# Patient Record
Sex: MALE | Race: WHITE | NOT HISPANIC OR LATINO | Employment: OTHER | URBAN - NONMETROPOLITAN AREA
[De-identification: names, ages, dates, MRNs, and addresses within clinical notes are randomized per-mention and may not be internally consistent; named-entity substitution may affect disease eponyms.]

---

## 2017-02-22 ENCOUNTER — OFFICE VISIT (OUTPATIENT)
Dept: UROLOGY | Facility: CLINIC | Age: 72
End: 2017-02-22

## 2017-02-22 ENCOUNTER — LAB (OUTPATIENT)
Dept: LAB | Facility: HOSPITAL | Age: 72
End: 2017-02-22
Attending: UROLOGY

## 2017-02-22 VITALS — HEIGHT: 67 IN | BODY MASS INDEX: 20.4 KG/M2 | WEIGHT: 130 LBS

## 2017-02-22 DIAGNOSIS — N40.1 BENIGN NON-NODULAR PROSTATIC HYPERPLASIA WITH LOWER URINARY TRACT SYMPTOMS: Primary | ICD-10-CM

## 2017-02-22 DIAGNOSIS — N40.1 BENIGN NON-NODULAR PROSTATIC HYPERPLASIA WITH LOWER URINARY TRACT SYMPTOMS: ICD-10-CM

## 2017-02-22 DIAGNOSIS — N52.9 ERECTILE DYSFUNCTION, UNSPECIFIED ERECTILE DYSFUNCTION TYPE: ICD-10-CM

## 2017-02-22 LAB — PSA SERPL-MCNC: 0.35 NG/ML (ref 0–4)

## 2017-02-22 PROCEDURE — 84153 ASSAY OF PSA TOTAL: CPT | Performed by: UROLOGY

## 2017-02-22 PROCEDURE — 36415 COLL VENOUS BLD VENIPUNCTURE: CPT | Performed by: UROLOGY

## 2017-02-22 PROCEDURE — 99203 OFFICE O/P NEW LOW 30 MIN: CPT | Performed by: UROLOGY

## 2017-02-22 RX ORDER — METOPROLOL SUCCINATE 50 MG/1
TABLET, EXTENDED RELEASE ORAL
Refills: 6 | COMMUNITY
Start: 2017-02-01

## 2017-02-22 RX ORDER — ROSUVASTATIN CALCIUM 5 MG/1
TABLET, COATED ORAL
Refills: 2 | COMMUNITY
Start: 2017-01-17

## 2017-02-22 RX ORDER — AMLODIPINE BESYLATE 5 MG/1
TABLET ORAL
Refills: 2 | COMMUNITY
Start: 2017-02-15

## 2017-02-22 RX ORDER — LEVALBUTEROL INHALATION SOLUTION 0.31 MG/3ML
SOLUTION RESPIRATORY (INHALATION)
Refills: 2 | COMMUNITY
Start: 2017-02-10

## 2017-02-22 NOTE — PROGRESS NOTES
Chief Complaint:          Chief Complaint   Patient presents with   • Erectile Dysfunction     Patient presents for erectile dysfunction.  He would like to try some Viagra.  He also would like a yearly prostate exam.  Patient denies any voiding problems and nocturia.       HPI:   71 y.o. male.    HPI  Pt has tried viagra and it has been useful to him.  Pt does not take nitrates.      Past Medical History:        Past Medical History   Diagnosis Date   • COPD (chronic obstructive pulmonary disease)    • Erectile dysfunction    • Hyperlipidemia    • Hypertension          Current Meds:     Current Outpatient Prescriptions   Medication Sig Dispense Refill   • amLODIPine (NORVASC) 5 MG tablet   2   • levalbuterol (XOPENEX) 0.31 MG/3ML nebulizer solution USE 1 VIAL IN NEBULIZER 3 TIMES DAILY AS NEEDED  2   • metoprolol succinate XL (TOPROL-XL) 50 MG 24 hr tablet   6   • NEXIUM 40 MG capsule   2   • rosuvastatin (CRESTOR) 5 MG tablet   2   • VENTOLIN  (90 BASE) MCG/ACT inhaler   2     No current facility-administered medications for this visit.         Allergies:      Allergies   Allergen Reactions   • Albuterol    • Sulfa Antibiotics    • Ultram [Tramadol Hcl]         Past Surgical History:     Past Surgical History   Procedure Laterality Date   • Cyst removal     • Hand surgery     • Throat surgery       removal of growth         Social History:     Social History     Social History   • Marital status: Unknown     Spouse name: N/A   • Number of children: N/A   • Years of education: N/A     Occupational History   • Not on file.     Social History Main Topics   • Smoking status: Current Every Day Smoker   • Smokeless tobacco: Not on file   • Alcohol use No   • Drug use: Not on file   • Sexual activity: Not on file     Other Topics Concern   • Not on file     Social History Narrative   • No narrative on file       Family History:     Family History   Problem Relation Age of Onset   • Lung cancer Father        Review  of Systems:     Review of Systems    Physical Exam:     Physical Exam   Constitutional: He is oriented to person, place, and time.   HENT:   Head: Normocephalic and atraumatic.   Right Ear: External ear normal.   Left Ear: External ear normal.   Nose: Nose normal.   Mouth/Throat: Oropharynx is clear and moist.   Eyes: Conjunctivae and EOM are normal. Pupils are equal, round, and reactive to light.   Neck: Normal range of motion. Neck supple. No thyromegaly present.   Cardiovascular: Normal rate, regular rhythm, normal heart sounds and intact distal pulses.    No murmur heard.  Pulmonary/Chest: Breath sounds normal. No respiratory distress. He has no wheezes. He has no rales. He exhibits no tenderness.   Changes of copd   Abdominal: Soft. Bowel sounds are normal. He exhibits no distension and no mass. There is no tenderness. No hernia.   Genitourinary: Rectum normal, prostate normal and penis normal.   Musculoskeletal: He exhibits no edema or tenderness.   Lymphadenopathy:     He has no cervical adenopathy.   Neurological: He is alert and oriented to person, place, and time. No cranial nerve deficit. He exhibits normal muscle tone. Coordination normal.   Skin: Skin is warm. No rash noted.   Psychiatric: He has a normal mood and affect. Judgment normal.       Procedure:     No notes on file      Assessment:     Encounter Diagnoses   Name Primary?   • Benign non-nodular prostatic hyperplasia with lower urinary tract symptoms Yes   • Erectile dysfunction, unspecified erectile dysfunction type      No orders of the defined types were placed in this encounter.      Plan:   Will check a psa and i have written for sildenafil or viagra.  Will see pt back in 1 year with a psa prior.    Counseling was given to patient for the following topics impressions. and the interim medical history and current results were reviewed.  A treatment plan with follow-up was made. Total time of the encounter was 37 minutes and 37 minutes were  spent discussing Benign non-nodular prostatic hyperplasia with lower urinary tract symptoms [N40.1] face-to-face.    This document has been electronically signed by Renny Chavez MD February 22, 2017 9:40 AM

## 2017-03-24 ENCOUNTER — HOSPITAL ENCOUNTER (EMERGENCY)
Dept: HOSPITAL 79 - ER1 | Age: 72
Discharge: HOME | End: 2017-03-24
Payer: COMMERCIAL

## 2017-03-24 DIAGNOSIS — I73.9: ICD-10-CM
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
## 2017-03-24 DIAGNOSIS — S16.1XXA: Primary | ICD-10-CM
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
## 2017-03-24 DIAGNOSIS — Z79.899: ICD-10-CM
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
## 2017-03-24 DIAGNOSIS — Z88.5: ICD-10-CM

## 2017-03-24 DIAGNOSIS — Z79.82: ICD-10-CM

## 2017-03-24 DIAGNOSIS — R10.9: ICD-10-CM

## 2017-03-24 DIAGNOSIS — F17.210: ICD-10-CM

## 2017-03-24 DIAGNOSIS — Z79.02: ICD-10-CM

## 2017-03-24 DIAGNOSIS — Z88.2: ICD-10-CM

## 2017-03-24 DIAGNOSIS — I25.10: ICD-10-CM

## 2017-03-24 LAB
BUN/CREATININE RATIO: 9 (ref 0–10)
HGB BLD-MCNC: 13.5 GM/DL (ref 14–17.5)
RED BLOOD COUNT: 4.17 M/UL (ref 4.2–5.5)
WHITE BLOOD COUNT: 12.6 K/UL (ref 4.5–11)

## 2017-03-28 ENCOUNTER — HOSPITAL ENCOUNTER (OUTPATIENT)
Dept: HOSPITAL 79 - KOH-I | Age: 72
End: 2017-03-28
Payer: MEDICARE

## 2017-03-28 DIAGNOSIS — R06.00: ICD-10-CM

## 2017-03-28 DIAGNOSIS — R05: Primary | ICD-10-CM

## 2017-03-28 DIAGNOSIS — J43.9: ICD-10-CM

## 2017-05-15 ENCOUNTER — HOSPITAL ENCOUNTER (OUTPATIENT)
Dept: HOSPITAL 79 - KOH-I | Age: 72
End: 2017-05-15
Attending: NURSE PRACTITIONER
Payer: MEDICARE

## 2017-05-15 DIAGNOSIS — M25.831: ICD-10-CM

## 2017-05-15 DIAGNOSIS — M25.531: Primary | ICD-10-CM

## 2017-05-15 DIAGNOSIS — R60.0: ICD-10-CM

## 2020-07-09 ENCOUNTER — TRANSCRIBE ORDERS (OUTPATIENT)
Dept: ADMINISTRATIVE | Facility: HOSPITAL | Age: 75
End: 2020-07-09

## 2020-07-09 DIAGNOSIS — Z87.891 PERSONAL HISTORY OF TOBACCO USE, PRESENTING HAZARDS TO HEALTH: Primary | ICD-10-CM

## 2020-07-29 ENCOUNTER — APPOINTMENT (OUTPATIENT)
Dept: CT IMAGING | Facility: HOSPITAL | Age: 75
End: 2020-07-29

## 2020-07-30 ENCOUNTER — HOSPITAL ENCOUNTER (OUTPATIENT)
Dept: CT IMAGING | Facility: HOSPITAL | Age: 75
Discharge: HOME | End: 2020-07-30
Admitting: FAMILY MEDICINE

## 2020-07-30 DIAGNOSIS — Z87.891 PERSONAL HISTORY OF TOBACCO USE, PRESENTING HAZARDS TO HEALTH: ICD-10-CM

## 2020-07-30 PROCEDURE — G0297 LDCT FOR LUNG CA SCREEN: HCPCS | Performed by: RADIOLOGY

## 2020-07-30 PROCEDURE — G0297 LDCT FOR LUNG CA SCREEN: HCPCS

## 2021-08-31 ENCOUNTER — HOSPITAL ENCOUNTER (OUTPATIENT)
Dept: HOSPITAL 79 - ECHO | Age: 76
End: 2021-08-31
Attending: PHYSICIAN ASSISTANT
Payer: MEDICARE

## 2021-08-31 DIAGNOSIS — I08.1: ICD-10-CM

## 2021-08-31 DIAGNOSIS — I42.9: ICD-10-CM

## 2021-08-31 DIAGNOSIS — I20.9: Primary | ICD-10-CM

## 2021-08-31 DIAGNOSIS — I27.20: ICD-10-CM

## 2021-08-31 DIAGNOSIS — I50.20: ICD-10-CM

## 2021-08-31 DIAGNOSIS — R00.2: ICD-10-CM

## 2021-08-31 PROCEDURE — A9502 TC99M TETROFOSMIN: HCPCS

## 2021-11-17 ENCOUNTER — HOSPITAL ENCOUNTER (OUTPATIENT)
Dept: HOSPITAL 79 - CATH | Age: 76
End: 2021-11-17
Attending: INTERNAL MEDICINE
Payer: MEDICARE

## 2021-11-17 DIAGNOSIS — I44.7: ICD-10-CM

## 2021-11-17 DIAGNOSIS — I50.22: ICD-10-CM

## 2021-11-17 DIAGNOSIS — I42.0: ICD-10-CM

## 2021-11-17 DIAGNOSIS — I25.5: ICD-10-CM

## 2021-11-17 DIAGNOSIS — I27.20: ICD-10-CM

## 2021-11-17 DIAGNOSIS — Z95.810: ICD-10-CM

## 2021-11-17 DIAGNOSIS — F17.210: ICD-10-CM

## 2021-11-17 DIAGNOSIS — I25.118: Primary | ICD-10-CM

## 2021-11-17 DIAGNOSIS — I25.82: ICD-10-CM

## 2021-11-17 DIAGNOSIS — E78.5: ICD-10-CM

## 2021-11-17 DIAGNOSIS — I25.2: ICD-10-CM

## 2021-11-17 DIAGNOSIS — J44.9: ICD-10-CM

## 2021-11-17 DIAGNOSIS — K21.9: ICD-10-CM

## 2021-11-17 DIAGNOSIS — I11.0: ICD-10-CM

## 2021-11-17 DIAGNOSIS — Z95.820: ICD-10-CM

## 2021-11-17 PROCEDURE — C1760 CLOSURE DEV, VASC: HCPCS

## 2021-11-17 PROCEDURE — C1769 GUIDE WIRE: HCPCS

## 2022-10-28 ENCOUNTER — TRANSCRIBE ORDERS (OUTPATIENT)
Dept: LAB | Facility: HOSPITAL | Age: 77
End: 2022-10-28

## 2022-10-28 DIAGNOSIS — R10.11 RUQ PAIN: Primary | ICD-10-CM

## 2022-11-02 ENCOUNTER — HOSPITAL ENCOUNTER (OUTPATIENT)
Dept: ULTRASOUND IMAGING | Facility: HOSPITAL | Age: 77
Discharge: HOME | End: 2022-11-02
Admitting: FAMILY MEDICINE

## 2022-11-02 DIAGNOSIS — R10.11 RUQ PAIN: ICD-10-CM

## 2022-11-02 PROCEDURE — 76700 US EXAM ABDOM COMPLETE: CPT | Performed by: RADIOLOGY

## 2022-11-02 PROCEDURE — 76700 US EXAM ABDOM COMPLETE: CPT

## 2022-12-20 ENCOUNTER — HOSPITAL ENCOUNTER (INPATIENT)
Facility: HOSPITAL | Age: 77
End: 2022-12-20
Attending: INTERNAL MEDICINE | Admitting: INTERNAL MEDICINE

## 2022-12-30 ENCOUNTER — HOSPITAL ENCOUNTER (OUTPATIENT)
Dept: GENERAL RADIOLOGY | Facility: HOSPITAL | Age: 77
Discharge: HOME | End: 2022-12-30
Admitting: NURSE PRACTITIONER

## 2022-12-30 ENCOUNTER — TRANSCRIBE ORDERS (OUTPATIENT)
Dept: GENERAL RADIOLOGY | Facility: HOSPITAL | Age: 77
End: 2022-12-30

## 2022-12-30 DIAGNOSIS — R07.89 OTHER CHEST PAIN: ICD-10-CM

## 2022-12-30 DIAGNOSIS — R07.89 OTHER CHEST PAIN: Primary | ICD-10-CM

## 2022-12-30 PROCEDURE — 71046 X-RAY EXAM CHEST 2 VIEWS: CPT | Performed by: RADIOLOGY

## 2022-12-30 PROCEDURE — 71046 X-RAY EXAM CHEST 2 VIEWS: CPT

## 2023-01-07 ENCOUNTER — LAB REQUISITION (OUTPATIENT)
Dept: LAB | Facility: HOSPITAL | Age: 78
End: 2023-01-07
Payer: MEDICARE

## 2023-01-07 DIAGNOSIS — N19 UNSPECIFIED KIDNEY FAILURE: ICD-10-CM

## 2023-01-07 LAB — NT-PROBNP SERPL-MCNC: 373 PG/ML (ref 0–1800)

## 2023-01-07 PROCEDURE — 83880 ASSAY OF NATRIURETIC PEPTIDE: CPT | Performed by: INTERNAL MEDICINE

## 2023-02-26 ENCOUNTER — APPOINTMENT (OUTPATIENT)
Dept: GENERAL RADIOLOGY | Facility: HOSPITAL | Age: 78
End: 2023-02-26
Payer: MEDICARE

## 2023-02-26 ENCOUNTER — APPOINTMENT (OUTPATIENT)
Dept: CT IMAGING | Facility: HOSPITAL | Age: 78
End: 2023-02-26
Payer: MEDICARE

## 2023-02-26 ENCOUNTER — HOSPITAL ENCOUNTER (EMERGENCY)
Facility: HOSPITAL | Age: 78
Discharge: NURSING, REHAB OR LONG-TERM CARE | End: 2023-02-26
Attending: EMERGENCY MEDICINE | Admitting: EMERGENCY MEDICINE
Payer: MEDICARE

## 2023-02-26 VITALS
DIASTOLIC BLOOD PRESSURE: 82 MMHG | HEART RATE: 70 BPM | WEIGHT: 130 LBS | OXYGEN SATURATION: 96 % | RESPIRATION RATE: 20 BRPM | BODY MASS INDEX: 20.4 KG/M2 | SYSTOLIC BLOOD PRESSURE: 168 MMHG | HEIGHT: 67 IN | TEMPERATURE: 98 F
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
## 2023-02-26 DIAGNOSIS — S09.90XA MINOR HEAD INJURY, INITIAL ENCOUNTER: ICD-10-CM
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
## 2023-02-26 LAB
ALBUMIN SERPL-MCNC: 3 G/DL (ref 3.5–5.2)
ALBUMIN/GLOB SERPL: 0.8 G/DL
ALP SERPL-CCNC: 178 U/L (ref 39–117)
ALT SERPL W P-5'-P-CCNC: 12 U/L (ref 1–41)
ANION GAP SERPL CALCULATED.3IONS-SCNC: 12.4 MMOL/L (ref 5–15)
AST SERPL-CCNC: 17 U/L (ref 1–40)
BACTERIA UR QL AUTO: ABNORMAL /HPF
BASOPHILS # BLD AUTO: 0.09 10*3/MM3 (ref 0–0.2)
BASOPHILS NFR BLD AUTO: 0.6 % (ref 0–1.5)
BILIRUB SERPL-MCNC: 0.2 MG/DL (ref 0–1.2)
BILIRUB UR QL STRIP: NEGATIVE
BUN SERPL-MCNC: 13 MG/DL (ref 8–23)
BUN/CREAT SERPL: 14.3 (ref 7–25)
CALCIUM SPEC-SCNC: 9.3 MG/DL (ref 8.6–10.5)
CHLORIDE SERPL-SCNC: 96 MMOL/L (ref 98–107)
CLARITY UR: CLEAR
CO2 SERPL-SCNC: 23.6 MMOL/L (ref 22–29)
COLOR UR: YELLOW
CREAT SERPL-MCNC: 0.91 MG/DL (ref 0.76–1.27)
CRP SERPL-MCNC: 6.81 MG/DL (ref 0–0.5)
DEPRECATED RDW RBC AUTO: 45.9 FL (ref 37–54)
EGFRCR SERPLBLD CKD-EPI 2021: 87.9 ML/MIN/1.73
EOSINOPHIL # BLD AUTO: 0.27 10*3/MM3 (ref 0–0.4)
EOSINOPHIL NFR BLD AUTO: 1.9 % (ref 0.3–6.2)
ERYTHROCYTE [DISTWIDTH] IN BLOOD BY AUTOMATED COUNT: 13.2 % (ref 12.3–15.4)
GLOBULIN UR ELPH-MCNC: 3.6 GM/DL
GLUCOSE SERPL-MCNC: 98 MG/DL (ref 65–99)
GLUCOSE UR STRIP-MCNC: NEGATIVE MG/DL
HCT VFR BLD AUTO: 28.5 % (ref 37.5–51)
HGB BLD-MCNC: 9.9 G/DL (ref 13–17.7)
HGB UR QL STRIP.AUTO: NEGATIVE
HOLD SPECIMEN: NORMAL
HOLD SPECIMEN: NORMAL
HYALINE CASTS UR QL AUTO: ABNORMAL /LPF
IMM GRANULOCYTES # BLD AUTO: 0.33 10*3/MM3 (ref 0–0.05)
IMM GRANULOCYTES NFR BLD AUTO: 2.3 % (ref 0–0.5)
KETONES UR QL STRIP: NEGATIVE
LEUKOCYTE ESTERASE UR QL STRIP.AUTO: NEGATIVE
LYMPHOCYTES # BLD AUTO: 2.09 10*3/MM3 (ref 0.7–3.1)
LYMPHOCYTES NFR BLD AUTO: 14.7 % (ref 19.6–45.3)
MCH RBC QN AUTO: 33.1 PG (ref 26.6–33)
MCHC RBC AUTO-ENTMCNC: 34.7 G/DL (ref 31.5–35.7)
MCV RBC AUTO: 95.3 FL (ref 79–97)
MONOCYTES # BLD AUTO: 1.72 10*3/MM3 (ref 0.1–0.9)
MONOCYTES NFR BLD AUTO: 12.1 % (ref 5–12)
NEUTROPHILS NFR BLD AUTO: 68.4 % (ref 42.7–76)
NEUTROPHILS NFR BLD AUTO: 9.73 10*3/MM3 (ref 1.7–7)
NITRITE UR QL STRIP: NEGATIVE
NRBC BLD AUTO-RTO: 0 /100 WBC (ref 0–0.2)
PH UR STRIP.AUTO: 7 [PH] (ref 5–8)
PLATELET # BLD AUTO: 621 10*3/MM3 (ref 140–450)
PMV BLD AUTO: 8.8 FL (ref 6–12)
POTASSIUM SERPL-SCNC: 4 MMOL/L (ref 3.5–5.2)
PROT SERPL-MCNC: 6.6 G/DL (ref 6–8.5)
PROT UR QL STRIP: ABNORMAL
RBC # BLD AUTO: 2.99 10*6/MM3 (ref 4.14–5.8)
RBC # UR STRIP: ABNORMAL /HPF
REF LAB TEST METHOD: ABNORMAL
SODIUM SERPL-SCNC: 132 MMOL/L (ref 136–145)
SP GR UR STRIP: 1.01 (ref 1–1.03)
SQUAMOUS #/AREA URNS HPF: ABNORMAL /HPF
UROBILINOGEN UR QL STRIP: ABNORMAL
WBC # UR STRIP: ABNORMAL /HPF
WBC NRBC COR # BLD: 14.23 10*3/MM3 (ref 3.4–10.8)
WHOLE BLOOD HOLD COAG: NORMAL
WHOLE BLOOD HOLD SPECIMEN: NORMAL

## 2023-02-26 PROCEDURE — 85025 COMPLETE CBC W/AUTO DIFF WBC: CPT | Performed by: NURSE PRACTITIONER

## 2023-02-26 PROCEDURE — P9612 CATHETERIZE FOR URINE SPEC: HCPCS

## 2023-02-26 PROCEDURE — 81001 URINALYSIS AUTO W/SCOPE: CPT | Performed by: NURSE PRACTITIONER

## 2023-02-26 PROCEDURE — 86140 C-REACTIVE PROTEIN: CPT | Performed by: NURSE PRACTITIONER

## 2023-02-26 PROCEDURE — 36415 COLL VENOUS BLD VENIPUNCTURE: CPT

## 2023-02-26 PROCEDURE — 71045 X-RAY EXAM CHEST 1 VIEW: CPT

## 2023-02-26 PROCEDURE — 80053 COMPREHEN METABOLIC PANEL: CPT | Performed by: NURSE PRACTITIONER

## 2023-02-26 PROCEDURE — 70450 CT HEAD/BRAIN W/O DYE: CPT

## 2023-02-26 PROCEDURE — 99284 EMERGENCY DEPT VISIT MOD MDM: CPT

## 2023-03-01 ENCOUNTER — LAB REQUISITION (OUTPATIENT)
Dept: LAB | Facility: HOSPITAL | Age: 78
End: 2023-03-01
Payer: MEDICARE

## 2023-03-01 DIAGNOSIS — I10 ESSENTIAL (PRIMARY) HYPERTENSION: ICD-10-CM

## 2023-03-01 LAB
ANION GAP SERPL CALCULATED.3IONS-SCNC: 8.5 MMOL/L (ref 5–15)
BASOPHILS # BLD AUTO: 0.12 10*3/MM3 (ref 0–0.2)
BASOPHILS NFR BLD AUTO: 0.8 % (ref 0–1.5)
BUN SERPL-MCNC: 16 MG/DL (ref 8–23)
BUN/CREAT SERPL: 16.5 (ref 7–25)
CALCIUM SPEC-SCNC: 9.7 MG/DL (ref 8.6–10.5)
CHLORIDE SERPL-SCNC: 95 MMOL/L (ref 98–107)
CO2 SERPL-SCNC: 29.5 MMOL/L (ref 22–29)
CREAT SERPL-MCNC: 0.97 MG/DL (ref 0.76–1.27)
CRP SERPL-MCNC: 4.93 MG/DL (ref 0–0.5)
DEPRECATED RDW RBC AUTO: 48 FL (ref 37–54)
EGFRCR SERPLBLD CKD-EPI 2021: 81.4 ML/MIN/1.73
EOSINOPHIL # BLD AUTO: 0.35 10*3/MM3 (ref 0–0.4)
EOSINOPHIL NFR BLD AUTO: 2.5 % (ref 0.3–6.2)
ERYTHROCYTE [DISTWIDTH] IN BLOOD BY AUTOMATED COUNT: 13.3 % (ref 12.3–15.4)
GLUCOSE SERPL-MCNC: 114 MG/DL (ref 65–99)
HCT VFR BLD AUTO: 30.4 % (ref 37.5–51)
HGB BLD-MCNC: 10.1 G/DL (ref 13–17.7)
IMM GRANULOCYTES # BLD AUTO: 0.28 10*3/MM3 (ref 0–0.05)
IMM GRANULOCYTES NFR BLD AUTO: 2 % (ref 0–0.5)
LYMPHOCYTES # BLD AUTO: 2.15 10*3/MM3 (ref 0.7–3.1)
LYMPHOCYTES NFR BLD AUTO: 15.2 % (ref 19.6–45.3)
MCH RBC QN AUTO: 32.7 PG (ref 26.6–33)
MCHC RBC AUTO-ENTMCNC: 33.2 G/DL (ref 31.5–35.7)
MCV RBC AUTO: 98.4 FL (ref 79–97)
MONOCYTES # BLD AUTO: 1.43 10*3/MM3 (ref 0.1–0.9)
MONOCYTES NFR BLD AUTO: 10.1 % (ref 5–12)
NEUTROPHILS NFR BLD AUTO: 69.4 % (ref 42.7–76)
NEUTROPHILS NFR BLD AUTO: 9.81 10*3/MM3 (ref 1.7–7)
NRBC BLD AUTO-RTO: 0 /100 WBC (ref 0–0.2)
PLATELET # BLD AUTO: 641 10*3/MM3 (ref 140–450)
PMV BLD AUTO: 9 FL (ref 6–12)
POTASSIUM SERPL-SCNC: 4 MMOL/L (ref 3.5–5.2)
RBC # BLD AUTO: 3.09 10*6/MM3 (ref 4.14–5.8)
SODIUM SERPL-SCNC: 133 MMOL/L (ref 136–145)
WBC NRBC COR # BLD: 14.14 10*3/MM3 (ref 3.4–10.8)

## 2023-03-01 PROCEDURE — 84153 ASSAY OF PSA TOTAL: CPT | Performed by: INTERNAL MEDICINE

## 2023-03-01 PROCEDURE — 82607 VITAMIN B-12: CPT | Performed by: INTERNAL MEDICINE

## 2023-03-01 PROCEDURE — 85025 COMPLETE CBC W/AUTO DIFF WBC: CPT | Performed by: INTERNAL MEDICINE

## 2023-03-01 PROCEDURE — 80048 BASIC METABOLIC PNL TOTAL CA: CPT | Performed by: INTERNAL MEDICINE

## 2023-03-01 PROCEDURE — 86140 C-REACTIVE PROTEIN: CPT | Performed by: INTERNAL MEDICINE

## 2023-03-01 PROCEDURE — 82746 ASSAY OF FOLIC ACID SERUM: CPT | Performed by: INTERNAL MEDICINE

## 2023-03-02 LAB
FOLATE SERPL-MCNC: 12.9 NG/ML (ref 4.78–24.2)
PSA SERPL-MCNC: 0.45 NG/ML (ref 0–4)
VIT B12 BLD-MCNC: 831 PG/ML (ref 211–946)

## 2023-03-18 ENCOUNTER — HOSPITAL ENCOUNTER (EMERGENCY)
Facility: HOSPITAL | Age: 78
Discharge: HOME | End: 2023-03-18
Attending: STUDENT IN AN ORGANIZED HEALTH CARE EDUCATION/TRAINING PROGRAM | Admitting: STUDENT IN AN ORGANIZED HEALTH CARE EDUCATION/TRAINING PROGRAM
Payer: MEDICARE

## 2023-03-18 ENCOUNTER — APPOINTMENT (OUTPATIENT)
Dept: GENERAL RADIOLOGY | Facility: HOSPITAL | Age: 78
End: 2023-03-18
Payer: MEDICARE

## 2023-03-18 VITALS
DIASTOLIC BLOOD PRESSURE: 70 MMHG | RESPIRATION RATE: 17 BRPM | TEMPERATURE: 97.8 F | SYSTOLIC BLOOD PRESSURE: 160 MMHG | OXYGEN SATURATION: 99 % | BODY MASS INDEX: 19.46 KG/M2 | HEIGHT: 67 IN | WEIGHT: 124 LBS | HEART RATE: 70 BPM

## 2023-03-18 DIAGNOSIS — J18.9 PNEUMONIA OF BOTH LUNGS DUE TO INFECTIOUS ORGANISM, UNSPECIFIED PART OF LUNG: Primary | ICD-10-CM

## 2023-03-18 LAB
ALBUMIN SERPL-MCNC: 3.1 G/DL (ref 3.5–5.2)
ALBUMIN/GLOB SERPL: 0.9 G/DL
ALP SERPL-CCNC: 159 U/L (ref 39–117)
ALT SERPL W P-5'-P-CCNC: 9 U/L (ref 1–41)
ANION GAP SERPL CALCULATED.3IONS-SCNC: 9.6 MMOL/L (ref 5–15)
APTT PPP: 28.1 SECONDS (ref 26.5–34.5)
AST SERPL-CCNC: 12 U/L (ref 1–40)
BASOPHILS # BLD AUTO: 0.06 10*3/MM3 (ref 0–0.2)
BASOPHILS NFR BLD AUTO: 0.4 % (ref 0–1.5)
BILIRUB SERPL-MCNC: <0.2 MG/DL (ref 0–1.2)
BUN SERPL-MCNC: 20 MG/DL (ref 8–23)
BUN/CREAT SERPL: 22.5 (ref 7–25)
CALCIUM SPEC-SCNC: 9.1 MG/DL (ref 8.6–10.5)
CHLORIDE SERPL-SCNC: 101 MMOL/L (ref 98–107)
CO2 SERPL-SCNC: 21.4 MMOL/L (ref 22–29)
CREAT SERPL-MCNC: 0.89 MG/DL (ref 0.76–1.27)
DEPRECATED RDW RBC AUTO: 51.2 FL (ref 37–54)
EGFRCR SERPLBLD CKD-EPI 2021: 89.4 ML/MIN/1.73
EOSINOPHIL # BLD AUTO: 0.16 10*3/MM3 (ref 0–0.4)
EOSINOPHIL NFR BLD AUTO: 1.2 % (ref 0.3–6.2)
ERYTHROCYTE [DISTWIDTH] IN BLOOD BY AUTOMATED COUNT: 14.8 % (ref 12.3–15.4)
GEN 5 2HR TROPONIN T REFLEX: 34 NG/L
GLOBULIN UR ELPH-MCNC: 3.4 GM/DL
GLUCOSE SERPL-MCNC: 119 MG/DL (ref 65–99)
HCT VFR BLD AUTO: 26.2 % (ref 37.5–51)
HGB BLD-MCNC: 8.4 G/DL (ref 13–17.7)
IMM GRANULOCYTES # BLD AUTO: 0.11 10*3/MM3 (ref 0–0.05)
IMM GRANULOCYTES NFR BLD AUTO: 0.8 % (ref 0–0.5)
INR PPP: 0.97 (ref 0.9–1.1)
LYMPHOCYTES # BLD AUTO: 2.55 10*3/MM3 (ref 0.7–3.1)
LYMPHOCYTES NFR BLD AUTO: 18.6 % (ref 19.6–45.3)
MCH RBC QN AUTO: 31 PG (ref 26.6–33)
MCHC RBC AUTO-ENTMCNC: 32.1 G/DL (ref 31.5–35.7)
MCV RBC AUTO: 96.7 FL (ref 79–97)
MONOCYTES # BLD AUTO: 1.54 10*3/MM3 (ref 0.1–0.9)
MONOCYTES NFR BLD AUTO: 11.3 % (ref 5–12)
NEUTROPHILS NFR BLD AUTO: 67.7 % (ref 42.7–76)
NEUTROPHILS NFR BLD AUTO: 9.26 10*3/MM3 (ref 1.7–7)
NRBC BLD AUTO-RTO: 0 /100 WBC (ref 0–0.2)
NT-PROBNP SERPL-MCNC: 743.1 PG/ML (ref 0–1800)
PLATELET # BLD AUTO: 509 10*3/MM3 (ref 140–450)
PMV BLD AUTO: 9.1 FL (ref 6–12)
POTASSIUM SERPL-SCNC: 4.5 MMOL/L (ref 3.5–5.2)
PROT SERPL-MCNC: 6.5 G/DL (ref 6–8.5)
PROTHROMBIN TIME: 13.1 SECONDS (ref 12.1–14.7)
QT INTERVAL: 478 MS
QTC INTERVAL: 533 MS
RBC # BLD AUTO: 2.71 10*6/MM3 (ref 4.14–5.8)
SODIUM SERPL-SCNC: 132 MMOL/L (ref 136–145)
TROPONIN T DELTA: 0 NG/L
TROPONIN T SERPL HS-MCNC: 34 NG/L
WBC NRBC COR # BLD: 13.68 10*3/MM3 (ref 3.4–10.8)

## 2023-03-18 PROCEDURE — 96365 THER/PROPH/DIAG IV INF INIT: CPT

## 2023-03-18 PROCEDURE — 85025 COMPLETE CBC W/AUTO DIFF WBC: CPT | Performed by: NURSE PRACTITIONER

## 2023-03-18 PROCEDURE — 25010000002 ONDANSETRON PER 1 MG: Performed by: NURSE PRACTITIONER

## 2023-03-18 PROCEDURE — 25010000002 CEFTRIAXONE PER 250 MG: Performed by: NURSE PRACTITIONER

## 2023-03-18 PROCEDURE — 96375 TX/PRO/DX INJ NEW DRUG ADDON: CPT

## 2023-03-18 PROCEDURE — 25010000002 MORPHINE PER 10 MG: Performed by: NURSE PRACTITIONER

## 2023-03-18 PROCEDURE — 93005 ELECTROCARDIOGRAM TRACING: CPT | Performed by: STUDENT IN AN ORGANIZED HEALTH CARE EDUCATION/TRAINING PROGRAM

## 2023-03-18 PROCEDURE — 80053 COMPREHEN METABOLIC PANEL: CPT | Performed by: NURSE PRACTITIONER

## 2023-03-18 PROCEDURE — 83880 ASSAY OF NATRIURETIC PEPTIDE: CPT | Performed by: NURSE PRACTITIONER

## 2023-03-18 PROCEDURE — 85610 PROTHROMBIN TIME: CPT | Performed by: NURSE PRACTITIONER

## 2023-03-18 PROCEDURE — 99284 EMERGENCY DEPT VISIT MOD MDM: CPT

## 2023-03-18 PROCEDURE — 84484 ASSAY OF TROPONIN QUANT: CPT | Performed by: NURSE PRACTITIONER

## 2023-03-18 PROCEDURE — 71045 X-RAY EXAM CHEST 1 VIEW: CPT

## 2023-03-18 PROCEDURE — 36415 COLL VENOUS BLD VENIPUNCTURE: CPT

## 2023-03-18 PROCEDURE — 85730 THROMBOPLASTIN TIME PARTIAL: CPT | Performed by: NURSE PRACTITIONER

## 2023-03-18 RX ORDER — MORPHINE SULFATE 2 MG/ML
2 INJECTION, SOLUTION INTRAMUSCULAR; INTRAVENOUS ONCE
Status: COMPLETED | OUTPATIENT
Start: 2023-03-18 | End: 2023-03-18

## 2023-03-18 RX ORDER — DOXYCYCLINE 100 MG/1
100 CAPSULE ORAL 2 TIMES DAILY
Qty: 20 CAPSULE | Refills: 0 | Status: SHIPPED | OUTPATIENT
Start: 2023-03-18

## 2023-03-18 RX ORDER — ASPIRIN 81 MG/1
324 TABLET, CHEWABLE ORAL ONCE
Status: COMPLETED | OUTPATIENT
Start: 2023-03-18 | End: 2023-03-18

## 2023-03-18 RX ORDER — ONDANSETRON 2 MG/ML
4 INJECTION INTRAMUSCULAR; INTRAVENOUS ONCE
Status: COMPLETED | OUTPATIENT
Start: 2023-03-18 | End: 2023-03-18

## 2023-03-18 RX ADMIN — MORPHINE SULFATE 2 MG: 2 INJECTION, SOLUTION INTRAMUSCULAR; INTRAVENOUS at 18:12

## 2023-03-18 RX ADMIN — ONDANSETRON 4 MG: 2 INJECTION INTRAMUSCULAR; INTRAVENOUS at 18:12

## 2023-03-18 RX ADMIN — CEFTRIAXONE 1 G: 1 INJECTION, POWDER, FOR SOLUTION INTRAMUSCULAR; INTRAVENOUS at 17:39

## 2023-03-18 RX ADMIN — ASPIRIN 324 MG: 81 TABLET, CHEWABLE ORAL at 14:10

## 2023-03-18 NOTE — ED NOTES
Called [facility] at this time for transportation back [place] [facility]. Faxed a PCS and Facesheet at this time

## 2023-03-18 NOTE — ED PROVIDER NOTES
Subjective   History of Present Illness  Patient is a 77-year-old white male presents emergency room today with left-sided lateral chest pain.  Patient states that pain since yesterday.  Patient reports that his left side of his chest is sore and denies any known injury but states she did have a fall a few weeks ago.  Patient denies shortness of breath.  Reports past history of cardiac disease and MI.  Patient reports pain is worse with pressure.  Patient reports pain is relieved by rest.    Chest Pain      Review of Systems   Constitutional: Negative.    HENT: Negative.    Eyes: Negative.    Respiratory: Negative.    Cardiovascular: Positive for chest pain.   Gastrointestinal: Negative.    Endocrine: Negative.    Genitourinary: Negative.    Musculoskeletal: Negative.    Skin: Negative.    Allergic/Immunologic: Negative.    Neurological: Negative.    Hematological: Negative.    Psychiatric/Behavioral: Negative.        Past Medical History:   Diagnosis Date   • COPD (chronic obstructive pulmonary disease) (HCC)    • Erectile dysfunction    • Hyperlipidemia    • Hypertension        Allergies   Allergen Reactions   • Albuterol    • Sulfa Antibiotics    • Ultram [Tramadol Hcl]        Past Surgical History:   Procedure Laterality Date   • CYST REMOVAL     • HAND SURGERY     • PACEMAKER IMPLANTATION     • THROAT SURGERY      removal of growth       Family History   Problem Relation Age of Onset   • Lung cancer Father        Social History     Socioeconomic History   • Marital status:    Tobacco Use   • Smoking status: Every Day   Substance and Sexual Activity   • Alcohol use: No           Objective   Physical Exam  Vitals and nursing note reviewed.   Constitutional:       Appearance: He is well-developed.   HENT:      Head: Normocephalic.      Right Ear: External ear normal.      Left Ear: External ear normal.   Eyes:      Conjunctiva/sclera: Conjunctivae normal.      Pupils: Pupils are equal, round, and reactive  to light.   Cardiovascular:      Rate and Rhythm: Normal rate and regular rhythm.      Heart sounds: Normal heart sounds.   Pulmonary:      Effort: Pulmonary effort is normal.      Breath sounds: Normal breath sounds.   Abdominal:      General: Bowel sounds are normal.      Palpations: Abdomen is soft.   Musculoskeletal:         General: Normal range of motion.      Cervical back: Normal range of motion and neck supple.   Skin:     General: Skin is warm and dry.      Capillary Refill: Capillary refill takes less than 2 seconds.   Neurological:      Mental Status: He is alert and oriented to person, place, and time.   Psychiatric:         Behavior: Behavior normal.         Thought Content: Thought content normal.         Procedures       Results for orders placed or performed during the hospital encounter of 03/18/23   Comprehensive Metabolic Panel    Specimen: Blood   Result Value Ref Range    Glucose 119 (H) 65 - 99 mg/dL    BUN 20 8 - 23 mg/dL    Creatinine 0.89 0.76 - 1.27 mg/dL    Sodium 132 (L) 136 - 145 mmol/L    Potassium 4.5 3.5 - 5.2 mmol/L    Chloride 101 98 - 107 mmol/L    CO2 21.4 (L) 22.0 - 29.0 mmol/L    Calcium 9.1 8.6 - 10.5 mg/dL    Total Protein 6.5 6.0 - 8.5 g/dL    Albumin 3.1 (L) 3.5 - 5.2 g/dL    ALT (SGPT) 9 1 - 41 U/L    AST (SGOT) 12 1 - 40 U/L    Alkaline Phosphatase 159 (H) 39 - 117 U/L    Total Bilirubin <0.2 0.0 - 1.2 mg/dL    Globulin 3.4 gm/dL    A/G Ratio 0.9 g/dL    BUN/Creatinine Ratio 22.5 7.0 - 25.0    Anion Gap 9.6 5.0 - 15.0 mmol/L    eGFR 89.4 >60.0 mL/min/1.73   Protime-INR    Specimen: Blood   Result Value Ref Range    Protime 13.1 12.1 - 14.7 Seconds    INR 0.97 0.90 - 1.10   aPTT    Specimen: Blood   Result Value Ref Range    PTT 28.1 26.5 - 34.5 seconds   High Sensitivity Troponin T    Specimen: Blood   Result Value Ref Range    HS Troponin T 34 (H) <15 ng/L   BNP    Specimen: Blood   Result Value Ref Range    proBNP 743.1 0.0 - 1,800.0 pg/mL   CBC Auto Differential     Specimen: Blood   Result Value Ref Range    WBC 13.68 (H) 3.40 - 10.80 10*3/mm3    RBC 2.71 (L) 4.14 - 5.80 10*6/mm3    Hemoglobin 8.4 (L) 13.0 - 17.7 g/dL    Hematocrit 26.2 (L) 37.5 - 51.0 %    MCV 96.7 79.0 - 97.0 fL    MCH 31.0 26.6 - 33.0 pg    MCHC 32.1 31.5 - 35.7 g/dL    RDW 14.8 12.3 - 15.4 %    RDW-SD 51.2 37.0 - 54.0 fl    MPV 9.1 6.0 - 12.0 fL    Platelets 509 (H) 140 - 450 10*3/mm3    Neutrophil % 67.7 42.7 - 76.0 %    Lymphocyte % 18.6 (L) 19.6 - 45.3 %    Monocyte % 11.3 5.0 - 12.0 %    Eosinophil % 1.2 0.3 - 6.2 %    Basophil % 0.4 0.0 - 1.5 %    Immature Grans % 0.8 (H) 0.0 - 0.5 %    Neutrophils, Absolute 9.26 (H) 1.70 - 7.00 10*3/mm3    Lymphocytes, Absolute 2.55 0.70 - 3.10 10*3/mm3    Monocytes, Absolute 1.54 (H) 0.10 - 0.90 10*3/mm3    Eosinophils, Absolute 0.16 0.00 - 0.40 10*3/mm3    Basophils, Absolute 0.06 0.00 - 0.20 10*3/mm3    Immature Grans, Absolute 0.11 (H) 0.00 - 0.05 10*3/mm3    nRBC 0.0 0.0 - 0.2 /100 WBC   High Sensitivity Troponin T 2Hr    Specimen: Blood   Result Value Ref Range    HS Troponin T 34 (H) <15 ng/L    Troponin T Delta 0 >=-4 - <+4 ng/L   ECG 12 Lead Chest Pain   Result Value Ref Range    QT Interval 478 ms    QTC Interval 533 ms     XR Chest AP   Final Result   Right basilar atelectasis or infiltrate. Patchy left basilar opacity which may reflect chronic small airways infection/inflammation.      Signer Name: Juan Paulino MD    Signed: 3/18/2023 3:51 PM    Workstation Name: [id number]     [facility]            ED Course  ED Course as of 03/22/23 1003   Sat Mar 18, 2023   1405 EKG notes ventricular paced rhythm.  75 bpm.  No acute ST elevation.  T wave inversion in the lateral leads.    Electronically signed by Karl Mckeon DO, 03/18/23, 2:05 PM EDT.   [SF]      ED Course User Index  [SF] Karl Mckeon DO                                           [facility]    Final diagnoses:   Pneumonia of both lungs due to infectious organism, unspecified part  of lung       ED Disposition  ED Disposition     ED Disposition   Discharge    Condition   Stable    Comment   --             Philippe Saraiba PA-C  [address] [address]  [phone]    Schedule an appointment as soon as possible for a visit   For further evaluation         Medication List      New Prescriptions    doxycycline 100 MG capsule  Commonly known as: MONODOX  Take 1 capsule by mouth 2 (Two) Times a Day.           Where to Get Your Medications      You can get these medications from any pharmacy    Bring a paper prescription for each of these medications  · doxycycline 100 MG capsule          Radhames Ortiz, APRN  03/22/23 1000

## 2023-05-05 ENCOUNTER — INFUSION (OUTPATIENT)
Dept: ONCOLOGY | Facility: HOSPITAL | Age: 78
End: 2023-05-05
Payer: MEDICARE

## 2023-05-05 VITALS
SYSTOLIC BLOOD PRESSURE: 146 MMHG | DIASTOLIC BLOOD PRESSURE: 53 MMHG | HEART RATE: 69 BPM | TEMPERATURE: 97.8 F | RESPIRATION RATE: 20 BRPM | OXYGEN SATURATION: 97 %

## 2023-05-05 DIAGNOSIS — D64.9 ANEMIA, UNSPECIFIED TYPE: Primary | ICD-10-CM

## 2023-05-05 LAB
ABO GROUP BLD: NORMAL
ABO GROUP BLD: NORMAL
BASOPHILS # BLD AUTO: 0.12 10*3/MM3 (ref 0–0.2)
BASOPHILS NFR BLD AUTO: 1 % (ref 0–1.5)
BLD GP AB SCN SERPL QL: NEGATIVE
DEPRECATED RDW RBC AUTO: 55 FL (ref 37–54)
EOSINOPHIL # BLD AUTO: 0.3 10*3/MM3 (ref 0–0.4)
EOSINOPHIL NFR BLD AUTO: 2.4 % (ref 0.3–6.2)
ERYTHROCYTE [DISTWIDTH] IN BLOOD BY AUTOMATED COUNT: 17.8 % (ref 12.3–15.4)
HCT VFR BLD AUTO: 24.7 % (ref 37.5–51)
HGB BLD-MCNC: 7.4 G/DL (ref 13–17.7)
IMM GRANULOCYTES # BLD AUTO: 0.15 10*3/MM3 (ref 0–0.05)
IMM GRANULOCYTES NFR BLD AUTO: 1.2 % (ref 0–0.5)
LYMPHOCYTES # BLD AUTO: 2.48 10*3/MM3 (ref 0.7–3.1)
LYMPHOCYTES NFR BLD AUTO: 20 % (ref 19.6–45.3)
MCH RBC QN AUTO: 25.3 PG (ref 26.6–33)
MCHC RBC AUTO-ENTMCNC: 30 G/DL (ref 31.5–35.7)
MCV RBC AUTO: 84.3 FL (ref 79–97)
MONOCYTES # BLD AUTO: 1.02 10*3/MM3 (ref 0.1–0.9)
MONOCYTES NFR BLD AUTO: 8.2 % (ref 5–12)
NEUTROPHILS NFR BLD AUTO: 67.2 % (ref 42.7–76)
NEUTROPHILS NFR BLD AUTO: 8.33 10*3/MM3 (ref 1.7–7)
NRBC BLD AUTO-RTO: 0.2 /100 WBC (ref 0–0.2)
PLATELET # BLD AUTO: 582 10*3/MM3 (ref 140–450)
PMV BLD AUTO: 9 FL (ref 6–12)
RBC # BLD AUTO: 2.93 10*6/MM3 (ref 4.14–5.8)
RH BLD: POSITIVE
RH BLD: POSITIVE
T&S EXPIRATION DATE: NORMAL
WBC NRBC COR # BLD: 12.4 10*3/MM3 (ref 3.4–10.8)

## 2023-05-05 PROCEDURE — 36430 TRANSFUSION BLD/BLD COMPNT: CPT

## 2023-05-05 PROCEDURE — P9016 RBC LEUKOCYTES REDUCED: HCPCS

## 2023-05-05 PROCEDURE — 86850 RBC ANTIBODY SCREEN: CPT

## 2023-05-05 PROCEDURE — 86901 BLOOD TYPING SEROLOGIC RH(D): CPT

## 2023-05-05 PROCEDURE — 86900 BLOOD TYPING SEROLOGIC ABO: CPT

## 2023-05-05 PROCEDURE — 86923 COMPATIBILITY TEST ELECTRIC: CPT

## 2023-05-05 PROCEDURE — 85025 COMPLETE CBC W/AUTO DIFF WBC: CPT

## 2023-05-06 LAB
BH BB BLOOD EXPIRATION DATE: NORMAL
BH BB BLOOD TYPE BARCODE: 6200
BH BB DISPENSE STATUS: NORMAL
BH BB PRODUCT CODE: NORMAL
BH BB UNIT NUMBER: NORMAL
CROSSMATCH INTERPRETATION: NORMAL
UNIT  ABO: NORMAL
UNIT  RH: NORMAL

## 2023-05-16 ENCOUNTER — LAB REQUISITION (OUTPATIENT)
Dept: LAB | Facility: HOSPITAL | Age: 78
End: 2023-05-16
Payer: MEDICARE

## 2023-05-16 LAB — HEMOCCULT STL QL: NEGATIVE

## 2023-05-16 PROCEDURE — 82272 OCCULT BLD FECES 1-3 TESTS: CPT | Performed by: INTERNAL MEDICINE

## 2023-10-04 ENCOUNTER — TRANSCRIBE ORDERS (OUTPATIENT)
Dept: ADMINISTRATIVE | Facility: HOSPITAL | Age: 78
End: 2023-10-04
Payer: MEDICARE

## 2023-10-04 ENCOUNTER — APPOINTMENT (OUTPATIENT)
Dept: CT IMAGING | Facility: HOSPITAL | Age: 78
End: 2023-10-04
Payer: MEDICARE

## 2023-10-04 ENCOUNTER — HOSPITAL ENCOUNTER (EMERGENCY)
Facility: HOSPITAL | Age: 78
Discharge: NURSING, REHAB OR LONG-TERM CARE | End: 2023-10-04
Attending: STUDENT IN AN ORGANIZED HEALTH CARE EDUCATION/TRAINING PROGRAM | Admitting: STUDENT IN AN ORGANIZED HEALTH CARE EDUCATION/TRAINING PROGRAM
Payer: MEDICARE

## 2023-10-04 ENCOUNTER — APPOINTMENT (OUTPATIENT)
Dept: GENERAL RADIOLOGY | Facility: HOSPITAL | Age: 78
End: 2023-10-04
Payer: MEDICARE

## 2023-10-04 VITALS
HEIGHT: 67 IN | HEART RATE: 61 BPM | WEIGHT: 125 LBS | SYSTOLIC BLOOD PRESSURE: 130 MMHG | RESPIRATION RATE: 20 BRPM | DIASTOLIC BLOOD PRESSURE: 69 MMHG | OXYGEN SATURATION: 97 % | BODY MASS INDEX: 19.62 KG/M2 | TEMPERATURE: 97.9 F

## 2023-10-04 DIAGNOSIS — I73.9 PERIPHERAL VASCULAR DISEASE, UNSPECIFIED: Primary | ICD-10-CM

## 2023-10-04 DIAGNOSIS — I65.23 BILATERAL CAROTID ARTERY STENOSIS: ICD-10-CM

## 2023-10-04 DIAGNOSIS — R53.1 RIGHT SIDED WEAKNESS: Primary | ICD-10-CM

## 2023-10-04 DIAGNOSIS — Z74.01 BEDBOUND: ICD-10-CM

## 2023-10-04 DIAGNOSIS — I73.9 PERIPHERAL ARTERY DISEASE: ICD-10-CM

## 2023-10-04 DIAGNOSIS — R53.1 WEAKNESS GENERALIZED: ICD-10-CM

## 2023-10-04 LAB
ABO GROUP BLD: NORMAL
ALBUMIN SERPL-MCNC: 3.8 G/DL (ref 3.5–5.2)
ALBUMIN/GLOB SERPL: 1.2 G/DL
ALP SERPL-CCNC: 112 U/L (ref 39–117)
ALT SERPL W P-5'-P-CCNC: 12 U/L (ref 1–41)
ANION GAP SERPL CALCULATED.3IONS-SCNC: 12.2 MMOL/L (ref 5–15)
APTT PPP: 28.7 SECONDS (ref 26.5–34.5)
AST SERPL-CCNC: 19 U/L (ref 1–40)
BASOPHILS # BLD AUTO: 0.09 10*3/MM3 (ref 0–0.2)
BASOPHILS NFR BLD AUTO: 0.8 % (ref 0–1.5)
BILIRUB SERPL-MCNC: 0.2 MG/DL (ref 0–1.2)
BLD GP AB SCN SERPL QL: NEGATIVE
BUN SERPL-MCNC: 14 MG/DL (ref 8–23)
BUN/CREAT SERPL: 11.9 (ref 7–25)
CALCIUM SPEC-SCNC: 9.8 MG/DL (ref 8.6–10.5)
CHLORIDE SERPL-SCNC: 99 MMOL/L (ref 98–107)
CO2 SERPL-SCNC: 21.8 MMOL/L (ref 22–29)
CREAT BLDA-MCNC: 1.2 MG/DL (ref 0.6–1.3)
CREAT SERPL-MCNC: 1.18 MG/DL (ref 0.76–1.27)
DEPRECATED RDW RBC AUTO: 54.1 FL (ref 37–54)
EGFRCR SERPLBLD CKD-EPI 2021: 64.3 ML/MIN/1.73
EOSINOPHIL # BLD AUTO: 0.27 10*3/MM3 (ref 0–0.4)
EOSINOPHIL NFR BLD AUTO: 2.3 % (ref 0.3–6.2)
ERYTHROCYTE [DISTWIDTH] IN BLOOD BY AUTOMATED COUNT: 14.5 % (ref 12.3–15.4)
GLOBULIN UR ELPH-MCNC: 3.2 GM/DL
GLUCOSE BLDC GLUCOMTR-MCNC: 98 MG/DL (ref 70–130)
GLUCOSE SERPL-MCNC: 113 MG/DL (ref 65–99)
HCT VFR BLD AUTO: 35.9 % (ref 37.5–51)
HGB BLD-MCNC: 11.9 G/DL (ref 13–17.7)
HOLD SPECIMEN: NORMAL
HOLD SPECIMEN: NORMAL
IMM GRANULOCYTES # BLD AUTO: 0.14 10*3/MM3 (ref 0–0.05)
IMM GRANULOCYTES NFR BLD AUTO: 1.2 % (ref 0–0.5)
INR PPP: 0.87 (ref 0.9–1.1)
LYMPHOCYTES # BLD AUTO: 1.76 10*3/MM3 (ref 0.7–3.1)
LYMPHOCYTES NFR BLD AUTO: 14.9 % (ref 19.6–45.3)
MCH RBC QN AUTO: 33.5 PG (ref 26.6–33)
MCHC RBC AUTO-ENTMCNC: 33.1 G/DL (ref 31.5–35.7)
MCV RBC AUTO: 101.1 FL (ref 79–97)
MONOCYTES # BLD AUTO: 0.97 10*3/MM3 (ref 0.1–0.9)
MONOCYTES NFR BLD AUTO: 8.2 % (ref 5–12)
NEUTROPHILS NFR BLD AUTO: 72.6 % (ref 42.7–76)
NEUTROPHILS NFR BLD AUTO: 8.58 10*3/MM3 (ref 1.7–7)
NRBC BLD AUTO-RTO: 0 /100 WBC (ref 0–0.2)
PLATELET # BLD AUTO: 425 10*3/MM3 (ref 140–450)
PMV BLD AUTO: 9.7 FL (ref 6–12)
POTASSIUM SERPL-SCNC: 4.6 MMOL/L (ref 3.5–5.2)
PROT SERPL-MCNC: 7 G/DL (ref 6–8.5)
PROTHROMBIN TIME: 12.3 SECONDS (ref 12.1–14.7)
RBC # BLD AUTO: 3.55 10*6/MM3 (ref 4.14–5.8)
RH BLD: POSITIVE
SODIUM SERPL-SCNC: 133 MMOL/L (ref 136–145)
T&S EXPIRATION DATE: NORMAL
TROPONIN T SERPL HS-MCNC: 34 NG/L
WBC NRBC COR # BLD: 11.81 10*3/MM3 (ref 3.4–10.8)
WHOLE BLOOD HOLD COAG: NORMAL
WHOLE BLOOD HOLD SPECIMEN: NORMAL

## 2023-10-04 PROCEDURE — 0042T HC CT CEREBRAL PERFUSION W/WO CONTRAST: CPT

## 2023-10-04 PROCEDURE — 70498 CT ANGIOGRAPHY NECK: CPT | Performed by: RADIOLOGY

## 2023-10-04 PROCEDURE — 85610 PROTHROMBIN TIME: CPT | Performed by: STUDENT IN AN ORGANIZED HEALTH CARE EDUCATION/TRAINING PROGRAM

## 2023-10-04 PROCEDURE — 85025 COMPLETE CBC W/AUTO DIFF WBC: CPT | Performed by: STUDENT IN AN ORGANIZED HEALTH CARE EDUCATION/TRAINING PROGRAM

## 2023-10-04 PROCEDURE — 86850 RBC ANTIBODY SCREEN: CPT | Performed by: STUDENT IN AN ORGANIZED HEALTH CARE EDUCATION/TRAINING PROGRAM

## 2023-10-04 PROCEDURE — 36415 COLL VENOUS BLD VENIPUNCTURE: CPT

## 2023-10-04 PROCEDURE — 71045 X-RAY EXAM CHEST 1 VIEW: CPT | Performed by: RADIOLOGY

## 2023-10-04 PROCEDURE — 70498 CT ANGIOGRAPHY NECK: CPT

## 2023-10-04 PROCEDURE — 70496 CT ANGIOGRAPHY HEAD: CPT | Performed by: RADIOLOGY

## 2023-10-04 PROCEDURE — 86900 BLOOD TYPING SEROLOGIC ABO: CPT | Performed by: STUDENT IN AN ORGANIZED HEALTH CARE EDUCATION/TRAINING PROGRAM

## 2023-10-04 PROCEDURE — 99285 EMERGENCY DEPT VISIT HI MDM: CPT

## 2023-10-04 PROCEDURE — 84484 ASSAY OF TROPONIN QUANT: CPT | Performed by: STUDENT IN AN ORGANIZED HEALTH CARE EDUCATION/TRAINING PROGRAM

## 2023-10-04 PROCEDURE — 82948 REAGENT STRIP/BLOOD GLUCOSE: CPT

## 2023-10-04 PROCEDURE — 99204 OFFICE O/P NEW MOD 45 MIN: CPT | Performed by: STUDENT IN AN ORGANIZED HEALTH CARE EDUCATION/TRAINING PROGRAM

## 2023-10-04 PROCEDURE — 80053 COMPREHEN METABOLIC PANEL: CPT | Performed by: STUDENT IN AN ORGANIZED HEALTH CARE EDUCATION/TRAINING PROGRAM

## 2023-10-04 PROCEDURE — 85730 THROMBOPLASTIN TIME PARTIAL: CPT | Performed by: STUDENT IN AN ORGANIZED HEALTH CARE EDUCATION/TRAINING PROGRAM

## 2023-10-04 PROCEDURE — 70450 CT HEAD/BRAIN W/O DYE: CPT

## 2023-10-04 PROCEDURE — 71045 X-RAY EXAM CHEST 1 VIEW: CPT

## 2023-10-04 PROCEDURE — 82565 ASSAY OF CREATININE: CPT

## 2023-10-04 PROCEDURE — 70496 CT ANGIOGRAPHY HEAD: CPT

## 2023-10-04 PROCEDURE — 25510000001 IOPAMIDOL PER 1 ML: Performed by: STUDENT IN AN ORGANIZED HEALTH CARE EDUCATION/TRAINING PROGRAM

## 2023-10-04 PROCEDURE — 93005 ELECTROCARDIOGRAM TRACING: CPT | Performed by: STUDENT IN AN ORGANIZED HEALTH CARE EDUCATION/TRAINING PROGRAM

## 2023-10-04 PROCEDURE — 0042T CT CEREBRAL PERFUSION W WO CONTRAST: CPT | Performed by: RADIOLOGY

## 2023-10-04 PROCEDURE — 86901 BLOOD TYPING SEROLOGIC RH(D): CPT | Performed by: STUDENT IN AN ORGANIZED HEALTH CARE EDUCATION/TRAINING PROGRAM

## 2023-10-04 RX ORDER — SODIUM CHLORIDE 0.9 % (FLUSH) 0.9 %
10 SYRINGE (ML) INJECTION AS NEEDED
Status: DISCONTINUED | OUTPATIENT
Start: 2023-10-04 | End: 2023-10-04 | Stop reason: HOSPADM

## 2023-10-04 RX ORDER — ATORVASTATIN CALCIUM 40 MG/1
40 TABLET, FILM COATED ORAL DAILY
Status: DISCONTINUED | OUTPATIENT
Start: 2023-10-04 | End: 2023-10-04 | Stop reason: HOSPADM

## 2023-10-04 RX ORDER — ATORVASTATIN CALCIUM 40 MG/1
40 TABLET, FILM COATED ORAL DAILY
Qty: 90 TABLET | Refills: 0 | Status: SHIPPED | OUTPATIENT
Start: 2023-10-04

## 2023-10-04 RX ORDER — ASPIRIN 81 MG/1
81 TABLET ORAL ONCE
Status: COMPLETED | OUTPATIENT
Start: 2023-10-04 | End: 2023-10-04

## 2023-10-04 RX ADMIN — ATORVASTATIN CALCIUM 40 MG: 40 TABLET, FILM COATED ORAL at 09:02

## 2023-10-04 RX ADMIN — ASPIRIN 81 MG: 81 TABLET, COATED ORAL at 09:02

## 2023-10-04 RX ADMIN — IOPAMIDOL 100 ML: 755 INJECTION, SOLUTION INTRAVENOUS at 07:43

## 2023-10-04 NOTE — ED PROVIDER NOTES
Subjective   History of Present Illness  77-year-old male currently residing at a local nursing home presents for reported weakness when attempting to transition him out of bed to a wheelchair.  Patient reported that he is chronically bedbound and cannot ambulate unassisted requires two-person assistance to stand and pivot into a wheelchair.  Patient has poor knowledge of his chronic medical conditions.  Supposedly last known well was around 6:00 and at 630 when nursing staff went to help him transition he appeared to be leaning towards the right side; ever, upon arrival in the ER the symptoms have resolved.    Review of Systems    Past Medical History:   Diagnosis Date    COPD (chronic obstructive pulmonary disease)     Erectile dysfunction     Hyperlipidemia     Hypertension        Allergies   Allergen Reactions    Albuterol     Sulfa Antibiotics     Ultram [Tramadol Hcl]        Past Surgical History:   Procedure Laterality Date    CYST REMOVAL      HAND SURGERY      PACEMAKER IMPLANTATION      THROAT SURGERY      removal of growth       Family History   Problem Relation Age of Onset    Lung cancer Father        Social History     Socioeconomic History    Marital status:    Tobacco Use    Smoking status: Every Day   Substance and Sexual Activity    Alcohol use: No           Objective   Physical Exam  Vitals and nursing note reviewed.   Constitutional:       Comments: Chronically ill debilitated individual ANO x4.   HENT:      Head: Normocephalic and atraumatic.   Eyes:      Extraocular Movements: Extraocular movements intact.      Pupils: Pupils are equal, round, and reactive to light.   Pulmonary:      Effort: Pulmonary effort is normal.      Breath sounds: Normal breath sounds.   Abdominal:      General: Abdomen is flat.      Palpations: Abdomen is soft.   Musculoskeletal:         General: Normal range of motion.      Cervical back: Normal range of motion and neck supple.   Skin:     General: Skin is  warm and dry.   Neurological:      General: No focal deficit present.      Comments: He is bedbound and chronically weak.  Decreased strength is symmetrical in the upper and lower extremities bilaterally.  No evidence of focal neurologic deficits       Critical Care  Performed by: Meghana Boss DO  Authorized by: Meghana Boss DO     Critical care provider statement:     Critical care time (minutes):  45    Critical care time was exclusive of:  Separately billable procedures and treating other patients and teaching time    Critical care was necessary to treat or prevent imminent or life-threatening deterioration of the following conditions:  CNS failure or compromise    Critical care was time spent personally by me on the following activities:  Blood draw for specimens, development of treatment plan with patient or surrogate, discussions with consultants, examination of patient, interpretation of cardiac output measurements, obtaining history from patient or surrogate, ordering and performing treatments and interventions, ordering and review of laboratory studies, ordering and review of radiographic studies, pulse oximetry, re-evaluation of patient's condition, review of old charts and vascular access procedures    I assumed direction of critical care for this patient from another provider in my specialty: no         Results for orders placed or performed during the hospital encounter of 10/04/23   Comprehensive Metabolic Panel    Specimen: Arm, Right; Blood   Result Value Ref Range    Glucose 113 (H) 65 - 99 mg/dL    BUN 14 8 - 23 mg/dL    Creatinine 1.18 0.76 - 1.27 mg/dL    Sodium 133 (L) 136 - 145 mmol/L    Potassium 4.6 3.5 - 5.2 mmol/L    Chloride 99 98 - 107 mmol/L    CO2 21.8 (L) 22.0 - 29.0 mmol/L    Calcium 9.8 8.6 - 10.5 mg/dL    Total Protein 7.0 6.0 - 8.5 g/dL    Albumin 3.8 3.5 - 5.2 g/dL    ALT (SGPT) 12 1 - 41 U/L    AST (SGOT) 19 1 - 40 U/L    Alkaline Phosphatase 112 39 - 117 U/L    Total  Bilirubin 0.2 0.0 - 1.2 mg/dL    Globulin 3.2 gm/dL    A/G Ratio 1.2 g/dL    BUN/Creatinine Ratio 11.9 7.0 - 25.0    Anion Gap 12.2 5.0 - 15.0 mmol/L    eGFR 64.3 >60.0 mL/min/1.73   Protime-INR    Specimen: Arm, Right; Blood   Result Value Ref Range    Protime 12.3 12.1 - 14.7 Seconds    INR 0.87 (L) 0.90 - 1.10   aPTT    Specimen: Arm, Right; Blood   Result Value Ref Range    PTT 28.7 26.5 - 34.5 seconds   Single High Sensitivity Troponin T    Specimen: Arm, Right; Blood   Result Value Ref Range    HS Troponin T 34 (H) <15 ng/L   CBC Auto Differential    Specimen: Arm, Right; Blood   Result Value Ref Range    WBC 11.81 (H) 3.40 - 10.80 10*3/mm3    RBC 3.55 (L) 4.14 - 5.80 10*6/mm3    Hemoglobin 11.9 (L) 13.0 - 17.7 g/dL    Hematocrit 35.9 (L) 37.5 - 51.0 %    .1 (H) 79.0 - 97.0 fL    MCH 33.5 (H) 26.6 - 33.0 pg    MCHC 33.1 31.5 - 35.7 g/dL    RDW 14.5 12.3 - 15.4 %    RDW-SD 54.1 (H) 37.0 - 54.0 fl    MPV 9.7 6.0 - 12.0 fL    Platelets 425 140 - 450 10*3/mm3    Neutrophil % 72.6 42.7 - 76.0 %    Lymphocyte % 14.9 (L) 19.6 - 45.3 %    Monocyte % 8.2 5.0 - 12.0 %    Eosinophil % 2.3 0.3 - 6.2 %    Basophil % 0.8 0.0 - 1.5 %    Immature Grans % 1.2 (H) 0.0 - 0.5 %    Neutrophils, Absolute 8.58 (H) 1.70 - 7.00 10*3/mm3    Lymphocytes, Absolute 1.76 0.70 - 3.10 10*3/mm3    Monocytes, Absolute 0.97 (H) 0.10 - 0.90 10*3/mm3    Eosinophils, Absolute 0.27 0.00 - 0.40 10*3/mm3    Basophils, Absolute 0.09 0.00 - 0.20 10*3/mm3    Immature Grans, Absolute 0.14 (H) 0.00 - 0.05 10*3/mm3    nRBC 0.0 0.0 - 0.2 /100 WBC   POC Glucose Once    Specimen: Blood   Result Value Ref Range    Glucose 98 70 - 130 mg/dL   POC Creatinine    Specimen: Blood   Result Value Ref Range    Creatinine 1.20 0.60 - 1.30 mg/dL   ECG 12 Lead Stroke Evaluation   Result Value Ref Range    QT Interval 534 ms    QTC Interval 546 ms   Type & Screen    Specimen: Arm, Right; Blood   Result Value Ref Range    ABO Type A     RH type Positive     Antibody  Screen Negative     T&S Expiration Date 10/7/2023 11:59:59 PM    Green Top (Gel)   Result Value Ref Range    Extra Tube Hold for add-ons.    Lavender Top   Result Value Ref Range    Extra Tube hold for add-on    Gold Top - SST   Result Value Ref Range    Extra Tube Hold for add-ons.    Light Blue Top   Result Value Ref Range    Extra Tube Hold for add-ons.      XR Chest 1 View   Final Result   No radiographic evidence of acute cardiac or pulmonary disease.               This report was finalized on 10/4/2023 8:27 AM by Dr. Tommy Hay MD.          CT CEREBRAL PERFUSION WITH & WITHOUT CONTRAST   Preliminary Result    IMPRESSION:    Ischemic tissue volume measures 197 cc, but the distribution is   scattered and, therefore, likely artifactual.          CT Angiogram Neck   Final Result   1.  High-grade stenosis at the right carotid bifurcation.   2.  High-grade stenosis at the left carotid bifurcation.   3.  High-grade stenosis in the proximal external carotid artery on the   right.           This report was finalized on 10/4/2023 7:54 AM by Dr. Tommy Hay MD.          CT Angiogram Head w AI Analysis of LVO   Final Result     No acute findings in the arteries of the head/brain.           This report was finalized on 10/4/2023 8:54 AM by Dr. Tommy Hay MD.          CT Head Without Contrast Stroke Protocol   Final Result       1. No evidence of an acute ischemic event   2. Atrophy and chronic microangiopathic change   3. No parenchymal mass, hemorrhage, or midline shift       Results were relayed to the emergency department at 7:19 a.m.       This report was finalized on 10/4/2023 7:20 AM by Dr. Tommy Hay MD.                  ED Course  ED Course as of 10/04/23 0938   Wed Oct 04, 2023   0725   Attempting to contact neurology for the third time.  Previously attempted to call at [phone] now.  No answer at this time.    patient had no evidence of gross focal neurologic deficits.  He appears to be a  chronically ill debilitated elderly male.  True last known well is difficult to determine however nursing staff reports that patient was roughly normal at 6 AM, but then the pt appeared weak on the right side when they attempted to get him up out of bed and have him stand.     Electronically signed by Meghana Boss DO, 10/04/23, 7:27 AM EDT.   [LK]   0817 Patient is back to his baseline.  Pt confirmed that he is bedbound and wheelchair-bound and requires two-person assist in order to stand and does not walk independently.    Patient does have significant peripheral artery disease with bilateral high-grade stenosis in the left and right carotid arteries with old infarct seen on CT imaging.  Patient was seen at bedside with  [LK]   0846 pt has evidence of pacemaker on previous chest x-rays.  MRI is unable to be performed in order has been canceled.   [LK]   0847 EKG shows atrial and ventricular paced rhythm.  Rate 63   QTc 546.  No acute ST elevation  Electronically signed by Meghana Boss DO, 10/04/23, 8:48 AM EDT.   [LK]   0914 MRI unable to be obtained spoke with neurology and felt that given patient back to his baselinE chronic deconditioned ability recommend starting aspirin and high intensity statin of Lipitor 40 mg would prefer prescribed at discharge. [LK]   0936 Discharge instructions to nursing home  No evidence of acute stroke was identified today.  Patient has significant high-grade bilateral stenosis of the carotid arteries with evidence of old stroke seen on CT imaging.  MRI cannot be performed because of pacemaker.  Neurology has decreased Crestor, started Lipitor 40 mg daily and aspirin 81 mg daily. [LK]      ED Course User Index  [VICENTE] Meghana Boss DO                                           Medical Decision Making  Problems Addressed:  Bedbound: complicated acute illness or injury  Peripheral artery disease: complicated acute illness or injury  Right sided weakness: complicated  acute illness or injury  Weakness generalized: complicated acute illness or injury    Amount and/or Complexity of Data Reviewed  Labs: ordered.  Radiology: ordered.  ECG/medicine tests: ordered.    Risk  OTC drugs.  Prescription drug management.        Final diagnoses:   Right sided weakness   Peripheral artery disease   Bedbound   Weakness generalized       ED Disposition  ED Disposition       ED Disposition   Discharge    Condition   Stable    Comment   --               No follow-up provider specified.       Medication List        New Prescriptions      atorvastatin 40 MG tablet  Commonly known as: Lipitor  Take 1 tablet by mouth Daily.            Stop      rosuvastatin 5 MG tablet  Commonly known as: CRESTOR               Where to Get Your Medications        These medications were sent to [facility] - [place], [place] - [address] [facility] - [phone] [facility] [phone]   [address] [place] [id number]      Phone: [phone]   atorvastatin 40 MG tablet            Meghana Boss DO  10/04/23 5843

## 2023-10-04 NOTE — ED NOTES
Patient continues to take pulse oximetry off. Patient reminded to keep it on. Family at bedside at this time.

## 2023-10-04 NOTE — CONSULTS
Psychiatric   Teleneurology Note    Patient Name: Adi Hull  : 1945  MRN: [id number]  Primary Care Physician: Philippe Sarabia PA-C  Referring Site: [place]  Location of Neurologist: [place]    Subjective   Teleneurology Initial Data           Neurologist Evaluation Date: 10/04/23 Neurologist Evaluation Time: 0810   Date Last Known Well: 10/04/23 Time Last Known Well: 06     History   77 years old white male with known diagnosis of generalized weakness being wheelchair-bound at baseline, essential hypertension, mixed hyperlipidemia presenting from nursing home due to strokelike symptoms described as veering to the right side while at the bathroom, last known normal at 6 AM.  During my assessment he was back to baseline with NIH score of 0.  I personally reviewed the CT scan of the head which showed age-indeterminate small hypodensities on the left medial vermis, CTA head and neck with no large vessel occlusion but severe atherosclerosis at the carotid bulb bilaterally more on the right side.  The CT perfusion was not diagnostic due to artifact Giving that the patient's symptoms improved with NIH score of 0, CT head already showing hypodensities we decided not to give TNK.       Stroke Risk Factors/ Pertinent Data     Stroke risk factors: carotid artery disease, dyslipidemia, hypertension  Anticoagulants prior to arrival: none  Antiplatelets prior to arrival: none  Statins prior to arrival: rousovastatin (Crestor)     Scoring Scales     Pre-Stroke Modified [place] Scale: 4 - Moderately severe disability.  Unable to walk without assistance, and unable to attend to own bodily needs without assistance. Intracerebral Hemmorhage (ICH) Score  Age>=80: no     NIH Stroke Scale           Interval: baseline  1a. Level of Consciousness: 0-->Alert, keenly responsive  1b. LOC Questions: 0-->Answers both questions correctly  1c. LOC Commands: 0-->Performs both tasks correctly  2. Best Gaze: 0-->Normal  3. Visual:  0-->No visual loss  4. Facial Palsy: 0-->Normal symmetrical movements  5a. Motor Arm, Left: 0-->No drift, limb holds 90 (or 45) degrees for full 10 secs  5b. Motor Arm, Right: 0-->No drift, limb holds 90 (or 45) degrees for full 10 secs  6a. Motor Leg, Left: 0-->No drift, leg holds 30 degree position for full 5 secs  6b. Motor Leg, Right: 0-->No drift, leg holds 30 degree position for full 5 secs  7. Limb Ataxia: 0-->Absent  8. Sensory: 0-->Normal, no sensory loss  9. Best Language: 0-->No aphasia, normal  10. Dysarthria: 0-->Normal  11. Extinction and Inattention (formerly Neglect): 0-->No abnormality     Review of Systems     Review of Systems  All reviewed are negative except what mentioned in the HPI.  Objective   Exam     Exam performed with the help of support staff from the referring site  Neurological Exam  General appearance, no acute distress, vitals reviewed  Heart function: Patient awake, alert and oriented x3, no aphasia, no neglect.  Cranial nerves II to XII grossly intact.  Motor exam: Moves all extremities against gravity with generalized weakness at baseline.  Sensory exam: Normal to light touch throughout  Coordination: Normal finger-nose-finger and heel-to-shin bilaterally  Gait: Tried to make the patient walk but he needs assistance of 2 people at baseline, wheelchair-bound.  Result Review    Results   I personally reviewed the CT scan of the head which showed small hypodensities on the right vermis/cerebellum concerning for subacute infarct versus artifact.    CTA head and neck showed severe atherosclerosis at the carotid bulb bilaterally more on the right side.  No large vessel occlusion appreciated.    CT perfusion was nondiagnostic due to artifact.       Personal review of CNS imaging:(Official report by radiologist pending)  Imaging  CTA Imaging Review: CTA reviewed, other (specify)  CT Perfusion Review: CT perfusion reviewed, ABNORMAL  CT Perfusion Details: Nondiagnostic due to bilateral  artifact.    Thrombolytic   Thrombolytics: thrombolytic not given     Assessment & Plan   Assessment/ Plan     Assessment:  Acute Stroke Evaluation: Suspected TIA or non disabling stroke- the risks of IV thrombolytic outweigh the benefits of treatment       Plan:  Final Impression and Plan: 77 years old white male with known diagnosis of generalized weakness being wheelchair-bound at baseline, history of high blood pressure, mixed hyperlipidemia presenting from nursing home complaining of generalized weakness and veering to the right side while at the bathroom, last known normal at 6 AM.  During my assessment he was back to baseline with NIH score of 0.  I personally reviewed the CT scan of the head which showed age-indeterminate small hypodensities on the left vermis which could represent subacute infarct versus artifact, CTA head and neck with no large vessel occlusion but severe atherosclerosis at the carotid bulb bilaterally more on the right side.  The CT perfusion was not diagnostic due to artifact.  Giving that the patient's symptoms improved with NIH score of 0, CT head already showing hypodensities we decided not to give TNK.    Plan:  1.  Get an MRI brain without contrast  2.  Start aspirin 300 mg rectally or 81 mg p.o. if the patient can tolerate and passed bedside swallow.  3.  Change home statin to Lipitor 40 mg  4.  Further recommendation/stroke work-up pending the MRI brain.  If MRI brain negative for acute stroke okay to discharge the patient back to nursing home.    Plan discussed with the patient, ED nurse, ED physician.    Disposition     Observation unit until MRI brain completed.         IStephanie MD, saw the patient on 10/04/23 at 0810 for an initial in-patient or emergency room telememedicine face to face consult using interactive technology for  . The location of the patient was [place]. I was located at [place].    I have proceeded with this evaluation at the request of the  referring practitioner as it is felt to be an emergency setting and no appropriate specialist is available to perform this evaluation. The [facility] has reported that this is the correct patient and has obtained consent from the patient/surrogate to perform this telemedicine evaluation(including obtaining history, performing examination and reviewing data provided by the patient an/or originating site of care provider)    I have introduced myself to the patient, provided my credentials, disclosed my location, and determined that, based on review of the patient's chart and discussion with the patient's primary team, telemedicine via a HIPAA compliant, real-time, face-to-face two-way, interactive audio and video platform is an appropriate and effective means of providing the service.    The patient/surrogate has a right to refuse this evaluation as they have been explained risks including potential loss of confidentiality, benefits, alternatives, and the potential need for subsequent face-to-face care. In this evaluation, we will be providing recommendations only.  The ultimate decision to follow or not to follow these recommendations will be left to the bedside treating/requesting practitioner.    The patient/surrogate has been notified that other healthcare professionals including technical person may be involved in this A/V evaluation.  All laws concerning confidentiality and patient access to medical records and copies of medical records apply to telemedicine.  The patient/surrogate has received the originating [facility]'s Health Notice of Privacy Practices.    Stephanie Mireles MD

## 2023-10-04 NOTE — DISCHARGE INSTRUCTIONS
No evidence of acute stroke was identified today.  Patient has significant high-grade bilateral stenosis of the carotid arteries with evidence of old stroke seen on CT imaging.  MRI cannot be performed because of pacemaker.  Neurology has decreased Crestor, started Lipitor 40 mg daily and aspirin 81 mg daily.

## 2023-10-05 ENCOUNTER — HOSPITAL ENCOUNTER (OUTPATIENT)
Dept: CARDIOLOGY | Facility: HOSPITAL | Age: 78
Discharge: HOME | End: 2023-10-05
Payer: MEDICARE

## 2023-10-05 DIAGNOSIS — I73.9 PERIPHERAL VASCULAR DISEASE, UNSPECIFIED: ICD-10-CM

## 2023-10-05 LAB
QT INTERVAL: 534 MS
QTC INTERVAL: 546 MS

## 2023-10-05 PROCEDURE — 93926 LOWER EXTREMITY STUDY: CPT

## 2023-11-04 ENCOUNTER — LAB REQUISITION (OUTPATIENT)
Dept: LAB | Facility: HOSPITAL | Age: 78
End: 2023-11-04
Payer: MEDICARE

## 2023-11-04 DIAGNOSIS — M62.81 MUSCLE WEAKNESS (GENERALIZED): ICD-10-CM

## 2023-11-04 LAB

## 2023-11-04 PROCEDURE — 81001 URINALYSIS AUTO W/SCOPE: CPT | Performed by: INTERNAL MEDICINE

## 2023-11-16 ENCOUNTER — TRANSCRIBE ORDERS (OUTPATIENT)
Dept: ADMINISTRATIVE | Facility: HOSPITAL | Age: 78
End: 2023-11-16
Payer: MEDICARE

## 2023-11-16 DIAGNOSIS — L03.031 SUBUNGUAL INFECTION OF TOE OF RIGHT FOOT: Primary | ICD-10-CM

## 2023-11-22 PROCEDURE — 85025 COMPLETE CBC W/AUTO DIFF WBC: CPT | Performed by: INTERNAL MEDICINE

## 2023-11-22 PROCEDURE — 80048 BASIC METABOLIC PNL TOTAL CA: CPT | Performed by: INTERNAL MEDICINE

## 2023-11-23 ENCOUNTER — LAB REQUISITION (OUTPATIENT)
Dept: LAB | Facility: HOSPITAL | Age: 78
End: 2023-11-23
Payer: MEDICARE

## 2023-11-23 DIAGNOSIS — N18.30 CHRONIC KIDNEY DISEASE, STAGE 3 UNSPECIFIED: ICD-10-CM

## 2023-11-23 LAB
ANION GAP SERPL CALCULATED.3IONS-SCNC: 9.5 MMOL/L (ref 5–15)
BASOPHILS # BLD AUTO: 0.09 10*3/MM3 (ref 0–0.2)
BASOPHILS NFR BLD AUTO: 0.8 % (ref 0–1.5)
BUN SERPL-MCNC: 18 MG/DL (ref 8–23)
BUN/CREAT SERPL: 16.5 (ref 7–25)
CALCIUM SPEC-SCNC: 9.1 MG/DL (ref 8.6–10.5)
CHLORIDE SERPL-SCNC: 101 MMOL/L (ref 98–107)
CO2 SERPL-SCNC: 22.5 MMOL/L (ref 22–29)
CREAT SERPL-MCNC: 1.09 MG/DL (ref 0.76–1.27)
DEPRECATED RDW RBC AUTO: 51.6 FL (ref 37–54)
EGFRCR SERPLBLD CKD-EPI 2021: 70.3 ML/MIN/1.73
EOSINOPHIL # BLD AUTO: 0.37 10*3/MM3 (ref 0–0.4)
EOSINOPHIL NFR BLD AUTO: 3.2 % (ref 0.3–6.2)
ERYTHROCYTE [DISTWIDTH] IN BLOOD BY AUTOMATED COUNT: 13.5 % (ref 12.3–15.4)
GLUCOSE SERPL-MCNC: 109 MG/DL (ref 65–99)
HCT VFR BLD AUTO: 35.3 % (ref 37.5–51)
HGB BLD-MCNC: 11.4 G/DL (ref 13–17.7)
IMM GRANULOCYTES # BLD AUTO: 0.16 10*3/MM3 (ref 0–0.05)
IMM GRANULOCYTES NFR BLD AUTO: 1.4 % (ref 0–0.5)
LYMPHOCYTES # BLD AUTO: 3.55 10*3/MM3 (ref 0.7–3.1)
LYMPHOCYTES NFR BLD AUTO: 30.6 % (ref 19.6–45.3)
MCH RBC QN AUTO: 33 PG (ref 26.6–33)
MCHC RBC AUTO-ENTMCNC: 32.3 G/DL (ref 31.5–35.7)
MCV RBC AUTO: 102.3 FL (ref 79–97)
MONOCYTES # BLD AUTO: 1.29 10*3/MM3 (ref 0.1–0.9)
MONOCYTES NFR BLD AUTO: 11.1 % (ref 5–12)
NEUTROPHILS NFR BLD AUTO: 52.9 % (ref 42.7–76)
NEUTROPHILS NFR BLD AUTO: 6.16 10*3/MM3 (ref 1.7–7)
NRBC BLD AUTO-RTO: 0 /100 WBC (ref 0–0.2)
PLATELET # BLD AUTO: 386 10*3/MM3 (ref 140–450)
PMV BLD AUTO: 10.1 FL (ref 6–12)
POTASSIUM SERPL-SCNC: 4.4 MMOL/L (ref 3.5–5.2)
RBC # BLD AUTO: 3.45 10*6/MM3 (ref 4.14–5.8)
SODIUM SERPL-SCNC: 133 MMOL/L (ref 136–145)
WBC NRBC COR # BLD AUTO: 11.62 10*3/MM3 (ref 3.4–10.8)

## 2023-11-28 ENCOUNTER — HOSPITAL ENCOUNTER (OUTPATIENT)
Dept: CT IMAGING | Facility: HOSPITAL | Age: 78
Discharge: HOME | End: 2023-11-28
Admitting: INTERNAL MEDICINE
Payer: MEDICARE

## 2023-11-28 DIAGNOSIS — L03.031 SUBUNGUAL INFECTION OF TOE OF RIGHT FOOT: ICD-10-CM

## 2023-11-28 PROCEDURE — 25510000001 IOPAMIDOL 61 % SOLUTION: Performed by: INTERNAL MEDICINE

## 2023-11-28 PROCEDURE — 73701 CT LOWER EXTREMITY W/DYE: CPT

## 2023-11-28 RX ADMIN — IOPAMIDOL 100 ML: 612 INJECTION, SOLUTION INTRAVENOUS at 09:31

## 2023-12-14 ENCOUNTER — LAB REQUISITION (OUTPATIENT)
Dept: LAB | Facility: HOSPITAL | Age: 78
End: 2023-12-14
Payer: MEDICARE

## 2023-12-14 DIAGNOSIS — I10 ESSENTIAL (PRIMARY) HYPERTENSION: ICD-10-CM

## 2023-12-14 DIAGNOSIS — Z20.828 CONTACT WITH AND (SUSPECTED) EXPOSURE TO OTHER VIRAL COMMUNICABLE DISEASES: ICD-10-CM

## 2023-12-14 LAB
ALBUMIN SERPL-MCNC: 3.5 G/DL (ref 3.5–5.2)
ALBUMIN/GLOB SERPL: 1.5 G/DL
ALP SERPL-CCNC: 110 U/L (ref 39–117)
ALT SERPL W P-5'-P-CCNC: 7 U/L (ref 1–41)
ANION GAP SERPL CALCULATED.3IONS-SCNC: 10.3 MMOL/L (ref 5–15)
AST SERPL-CCNC: 10 U/L (ref 1–40)
B PARAPERT DNA SPEC QL NAA+PROBE: NOT DETECTED
B PERT DNA SPEC QL NAA+PROBE: NOT DETECTED
BASOPHILS # BLD AUTO: 0.06 10*3/MM3 (ref 0–0.2)
BASOPHILS NFR BLD AUTO: 0.5 % (ref 0–1.5)
BILIRUB SERPL-MCNC: 0.5 MG/DL (ref 0–1.2)
BUN SERPL-MCNC: 15 MG/DL (ref 8–23)
BUN/CREAT SERPL: 16 (ref 7–25)
C PNEUM DNA NPH QL NAA+NON-PROBE: NOT DETECTED
CALCIUM SPEC-SCNC: 9.4 MG/DL (ref 8.6–10.5)
CHLORIDE SERPL-SCNC: 102 MMOL/L (ref 98–107)
CO2 SERPL-SCNC: 23.7 MMOL/L (ref 22–29)
CREAT SERPL-MCNC: 0.94 MG/DL (ref 0.76–1.27)
DEPRECATED RDW RBC AUTO: 47.9 FL (ref 37–54)
EGFRCR SERPLBLD CKD-EPI 2021: 84 ML/MIN/1.73
EOSINOPHIL # BLD AUTO: 0.38 10*3/MM3 (ref 0–0.4)
EOSINOPHIL NFR BLD AUTO: 3.4 % (ref 0.3–6.2)
ERYTHROCYTE [DISTWIDTH] IN BLOOD BY AUTOMATED COUNT: 13.3 % (ref 12.3–15.4)
FLUAV SUBTYP SPEC NAA+PROBE: NOT DETECTED
FLUBV RNA ISLT QL NAA+PROBE: NOT DETECTED
GLOBULIN UR ELPH-MCNC: 2.4 GM/DL
GLUCOSE SERPL-MCNC: 104 MG/DL (ref 65–99)
HADV DNA SPEC NAA+PROBE: NOT DETECTED
HCOV 229E RNA SPEC QL NAA+PROBE: NOT DETECTED
HCOV HKU1 RNA SPEC QL NAA+PROBE: NOT DETECTED
HCOV NL63 RNA SPEC QL NAA+PROBE: NOT DETECTED
HCOV OC43 RNA SPEC QL NAA+PROBE: NOT DETECTED
HCT VFR BLD AUTO: 36.3 % (ref 37.5–51)
HGB BLD-MCNC: 12 G/DL (ref 13–17.7)
HMPV RNA NPH QL NAA+NON-PROBE: NOT DETECTED
HPIV1 RNA ISLT QL NAA+PROBE: NOT DETECTED
HPIV2 RNA SPEC QL NAA+PROBE: NOT DETECTED
HPIV3 RNA NPH QL NAA+PROBE: NOT DETECTED
HPIV4 P GENE NPH QL NAA+PROBE: NOT DETECTED
IMM GRANULOCYTES # BLD AUTO: 0.04 10*3/MM3 (ref 0–0.05)
IMM GRANULOCYTES NFR BLD AUTO: 0.4 % (ref 0–0.5)
LYMPHOCYTES # BLD AUTO: 1.85 10*3/MM3 (ref 0.7–3.1)
LYMPHOCYTES NFR BLD AUTO: 16.5 % (ref 19.6–45.3)
M PNEUMO IGG SER IA-ACNC: NOT DETECTED
MCH RBC QN AUTO: 32.3 PG (ref 26.6–33)
MCHC RBC AUTO-ENTMCNC: 33.1 G/DL (ref 31.5–35.7)
MCV RBC AUTO: 97.8 FL (ref 79–97)
MONOCYTES # BLD AUTO: 1.09 10*3/MM3 (ref 0.1–0.9)
MONOCYTES NFR BLD AUTO: 9.7 % (ref 5–12)
NEUTROPHILS NFR BLD AUTO: 69.5 % (ref 42.7–76)
NEUTROPHILS NFR BLD AUTO: 7.76 10*3/MM3 (ref 1.7–7)
NRBC BLD AUTO-RTO: 0 /100 WBC (ref 0–0.2)
PLATELET # BLD AUTO: 245 10*3/MM3 (ref 140–450)
PMV BLD AUTO: 11.3 FL (ref 6–12)
POTASSIUM SERPL-SCNC: 4.2 MMOL/L (ref 3.5–5.2)
PROT SERPL-MCNC: 5.9 G/DL (ref 6–8.5)
RBC # BLD AUTO: 3.71 10*6/MM3 (ref 4.14–5.8)
RHINOVIRUS RNA SPEC NAA+PROBE: NOT DETECTED
RSV RNA NPH QL NAA+NON-PROBE: NOT DETECTED
SARS-COV-2 RNA NPH QL NAA+NON-PROBE: NOT DETECTED
SODIUM SERPL-SCNC: 136 MMOL/L (ref 136–145)
WBC NRBC COR # BLD AUTO: 11.18 10*3/MM3 (ref 3.4–10.8)

## 2023-12-14 PROCEDURE — 85025 COMPLETE CBC W/AUTO DIFF WBC: CPT | Performed by: NURSE PRACTITIONER

## 2023-12-14 PROCEDURE — 0202U NFCT DS 22 TRGT SARS-COV-2: CPT | Performed by: NURSE PRACTITIONER

## 2023-12-14 PROCEDURE — 80053 COMPREHEN METABOLIC PANEL: CPT | Performed by: NURSE PRACTITIONER

## 2024-01-09 ENCOUNTER — LAB REQUISITION (OUTPATIENT)
Dept: LAB | Facility: HOSPITAL | Age: 79
End: 2024-01-09
Payer: MEDICARE

## 2024-01-09 DIAGNOSIS — R05.8 OTHER SPECIFIED COUGH: ICD-10-CM

## 2024-01-09 DIAGNOSIS — R09.81 NASAL CONGESTION: ICD-10-CM

## 2024-01-09 LAB
ANION GAP SERPL CALCULATED.3IONS-SCNC: 8 MMOL/L (ref 5–15)
B PARAPERT DNA SPEC QL NAA+PROBE: NOT DETECTED
B PERT DNA SPEC QL NAA+PROBE: NOT DETECTED
BASOPHILS # BLD AUTO: 0.05 10*3/MM3 (ref 0–0.2)
BASOPHILS NFR BLD AUTO: 0.6 % (ref 0–1.5)
BUN SERPL-MCNC: 18 MG/DL (ref 8–23)
BUN/CREAT SERPL: 18.9 (ref 7–25)
C PNEUM DNA NPH QL NAA+NON-PROBE: NOT DETECTED
CALCIUM SPEC-SCNC: 9 MG/DL (ref 8.6–10.5)
CHLORIDE SERPL-SCNC: 103 MMOL/L (ref 98–107)
CO2 SERPL-SCNC: 25 MMOL/L (ref 22–29)
CREAT SERPL-MCNC: 0.95 MG/DL (ref 0.76–1.27)
DEPRECATED RDW RBC AUTO: 48.7 FL (ref 37–54)
EGFRCR SERPLBLD CKD-EPI 2021: 83 ML/MIN/1.73
EOSINOPHIL # BLD AUTO: 0.27 10*3/MM3 (ref 0–0.4)
EOSINOPHIL NFR BLD AUTO: 3.1 % (ref 0.3–6.2)
ERYTHROCYTE [DISTWIDTH] IN BLOOD BY AUTOMATED COUNT: 13.8 % (ref 12.3–15.4)
FLUAV SUBTYP SPEC NAA+PROBE: NOT DETECTED
FLUBV RNA ISLT QL NAA+PROBE: NOT DETECTED
GLUCOSE SERPL-MCNC: 93 MG/DL (ref 65–99)
HADV DNA SPEC NAA+PROBE: NOT DETECTED
HCOV 229E RNA SPEC QL NAA+PROBE: NOT DETECTED
HCOV HKU1 RNA SPEC QL NAA+PROBE: NOT DETECTED
HCOV NL63 RNA SPEC QL NAA+PROBE: NOT DETECTED
HCOV OC43 RNA SPEC QL NAA+PROBE: NOT DETECTED
HCT VFR BLD AUTO: 37 % (ref 37.5–51)
HGB BLD-MCNC: 12.3 G/DL (ref 13–17.7)
HMPV RNA NPH QL NAA+NON-PROBE: NOT DETECTED
HPIV1 RNA ISLT QL NAA+PROBE: NOT DETECTED
HPIV2 RNA SPEC QL NAA+PROBE: NOT DETECTED
HPIV3 RNA NPH QL NAA+PROBE: NOT DETECTED
HPIV4 P GENE NPH QL NAA+PROBE: DETECTED
IMM GRANULOCYTES # BLD AUTO: 0.04 10*3/MM3 (ref 0–0.05)
IMM GRANULOCYTES NFR BLD AUTO: 0.5 % (ref 0–0.5)
LYMPHOCYTES # BLD AUTO: 2.23 10*3/MM3 (ref 0.7–3.1)
LYMPHOCYTES NFR BLD AUTO: 25.2 % (ref 19.6–45.3)
M PNEUMO IGG SER IA-ACNC: NOT DETECTED
MCH RBC QN AUTO: 32.1 PG (ref 26.6–33)
MCHC RBC AUTO-ENTMCNC: 33.2 G/DL (ref 31.5–35.7)
MCV RBC AUTO: 96.6 FL (ref 79–97)
MONOCYTES # BLD AUTO: 1.23 10*3/MM3 (ref 0.1–0.9)
MONOCYTES NFR BLD AUTO: 13.9 % (ref 5–12)
NEUTROPHILS NFR BLD AUTO: 5.03 10*3/MM3 (ref 1.7–7)
NEUTROPHILS NFR BLD AUTO: 56.7 % (ref 42.7–76)
NRBC BLD AUTO-RTO: 0 /100 WBC (ref 0–0.2)
PLATELET # BLD AUTO: 278 10*3/MM3 (ref 140–450)
PMV BLD AUTO: 11.1 FL (ref 6–12)
POTASSIUM SERPL-SCNC: 3.8 MMOL/L (ref 3.5–5.2)
RBC # BLD AUTO: 3.83 10*6/MM3 (ref 4.14–5.8)
RHINOVIRUS RNA SPEC NAA+PROBE: NOT DETECTED
RSV RNA NPH QL NAA+NON-PROBE: NOT DETECTED
SARS-COV-2 RNA NPH QL NAA+NON-PROBE: NOT DETECTED
SODIUM SERPL-SCNC: 136 MMOL/L (ref 136–145)
WBC NRBC COR # BLD AUTO: 8.85 10*3/MM3 (ref 3.4–10.8)

## 2024-01-09 PROCEDURE — 0202U NFCT DS 22 TRGT SARS-COV-2: CPT | Performed by: NURSE PRACTITIONER

## 2024-01-09 PROCEDURE — 85025 COMPLETE CBC W/AUTO DIFF WBC: CPT | Performed by: NURSE PRACTITIONER

## 2024-01-09 PROCEDURE — 80048 BASIC METABOLIC PNL TOTAL CA: CPT | Performed by: NURSE PRACTITIONER

## 2024-02-09 ENCOUNTER — LAB REQUISITION (OUTPATIENT)
Dept: LAB | Facility: HOSPITAL | Age: 79
End: 2024-02-09
Payer: MEDICARE

## 2024-02-09 DIAGNOSIS — M62.81 MUSCLE WEAKNESS (GENERALIZED): ICD-10-CM

## 2024-02-09 DIAGNOSIS — I50.9 HEART FAILURE, UNSPECIFIED: ICD-10-CM

## 2024-02-09 LAB
ALBUMIN SERPL-MCNC: 3.2 G/DL (ref 3.5–5.2)
ALBUMIN/GLOB SERPL: 1.2 G/DL
ALP SERPL-CCNC: 110 U/L (ref 39–117)
ALT SERPL W P-5'-P-CCNC: 9 U/L (ref 1–41)
ANION GAP SERPL CALCULATED.3IONS-SCNC: 7.9 MMOL/L (ref 5–15)
AST SERPL-CCNC: 12 U/L (ref 1–40)
BASOPHILS # BLD AUTO: 0.06 10*3/MM3 (ref 0–0.2)
BASOPHILS NFR BLD AUTO: 0.6 % (ref 0–1.5)
BILIRUB SERPL-MCNC: 0.2 MG/DL (ref 0–1.2)
BUN SERPL-MCNC: 21 MG/DL (ref 8–23)
BUN/CREAT SERPL: 21.9 (ref 7–25)
CALCIUM SPEC-SCNC: 8.7 MG/DL (ref 8.6–10.5)
CHLORIDE SERPL-SCNC: 104 MMOL/L (ref 98–107)
CO2 SERPL-SCNC: 23.1 MMOL/L (ref 22–29)
CREAT SERPL-MCNC: 0.96 MG/DL (ref 0.76–1.27)
DEPRECATED RDW RBC AUTO: 51.1 FL (ref 37–54)
EGFRCR SERPLBLD CKD-EPI 2021: 81.9 ML/MIN/1.73
EOSINOPHIL # BLD AUTO: 0.17 10*3/MM3 (ref 0–0.4)
EOSINOPHIL NFR BLD AUTO: 1.7 % (ref 0.3–6.2)
ERYTHROCYTE [DISTWIDTH] IN BLOOD BY AUTOMATED COUNT: 14.8 % (ref 12.3–15.4)
GLOBULIN UR ELPH-MCNC: 2.6 GM/DL
GLUCOSE SERPL-MCNC: 108 MG/DL (ref 65–99)
HCT VFR BLD AUTO: 29.8 % (ref 37.5–51)
HGB BLD-MCNC: 10.1 G/DL (ref 13–17.7)
IMM GRANULOCYTES # BLD AUTO: 0.04 10*3/MM3 (ref 0–0.05)
IMM GRANULOCYTES NFR BLD AUTO: 0.4 % (ref 0–0.5)
LYMPHOCYTES # BLD AUTO: 2.03 10*3/MM3 (ref 0.7–3.1)
LYMPHOCYTES NFR BLD AUTO: 20.3 % (ref 19.6–45.3)
MCH RBC QN AUTO: 32.9 PG (ref 26.6–33)
MCHC RBC AUTO-ENTMCNC: 33.9 G/DL (ref 31.5–35.7)
MCV RBC AUTO: 97.1 FL (ref 79–97)
MONOCYTES # BLD AUTO: 1.28 10*3/MM3 (ref 0.1–0.9)
MONOCYTES NFR BLD AUTO: 12.8 % (ref 5–12)
NEUTROPHILS NFR BLD AUTO: 6.44 10*3/MM3 (ref 1.7–7)
NEUTROPHILS NFR BLD AUTO: 64.2 % (ref 42.7–76)
NRBC BLD AUTO-RTO: 0 /100 WBC (ref 0–0.2)
PLATELET # BLD AUTO: 272 10*3/MM3 (ref 140–450)
PMV BLD AUTO: 11.1 FL (ref 6–12)
POTASSIUM SERPL-SCNC: 3.8 MMOL/L (ref 3.5–5.2)
PROT SERPL-MCNC: 5.8 G/DL (ref 6–8.5)
RBC # BLD AUTO: 3.07 10*6/MM3 (ref 4.14–5.8)
SODIUM SERPL-SCNC: 135 MMOL/L (ref 136–145)
WBC NRBC COR # BLD AUTO: 10.02 10*3/MM3 (ref 3.4–10.8)

## 2024-02-09 PROCEDURE — 85025 COMPLETE CBC W/AUTO DIFF WBC: CPT | Performed by: INTERNAL MEDICINE

## 2024-02-09 PROCEDURE — 80053 COMPREHEN METABOLIC PANEL: CPT | Performed by: INTERNAL MEDICINE

## 2024-03-29 ENCOUNTER — LAB REQUISITION (OUTPATIENT)
Dept: LAB | Facility: HOSPITAL | Age: 79
End: 2024-03-29
Payer: MEDICARE

## 2024-03-29 DIAGNOSIS — N18.30 CHRONIC KIDNEY DISEASE, STAGE 3 UNSPECIFIED: ICD-10-CM

## 2024-03-29 DIAGNOSIS — L03.115 CELLULITIS OF RIGHT LOWER LIMB: ICD-10-CM

## 2024-03-29 LAB
ANION GAP SERPL CALCULATED.3IONS-SCNC: 13.1 MMOL/L (ref 5–15)
BUN SERPL-MCNC: 17 MG/DL (ref 8–23)
BUN/CREAT SERPL: 16.5 (ref 7–25)
CALCIUM SPEC-SCNC: 9 MG/DL (ref 8.6–10.5)
CHLORIDE SERPL-SCNC: 103 MMOL/L (ref 98–107)
CO2 SERPL-SCNC: 19.9 MMOL/L (ref 22–29)
CREAT SERPL-MCNC: 1.03 MG/DL (ref 0.76–1.27)
CRP SERPL-MCNC: 0.35 MG/DL (ref 0–0.5)
EGFRCR SERPLBLD CKD-EPI 2021: 75.3 ML/MIN/1.73
GLUCOSE SERPL-MCNC: 87 MG/DL (ref 65–99)
POTASSIUM SERPL-SCNC: 4.3 MMOL/L (ref 3.5–5.2)
SODIUM SERPL-SCNC: 136 MMOL/L (ref 136–145)

## 2024-03-29 PROCEDURE — 86140 C-REACTIVE PROTEIN: CPT | Performed by: INTERNAL MEDICINE

## 2024-03-29 PROCEDURE — 80048 BASIC METABOLIC PNL TOTAL CA: CPT | Performed by: INTERNAL MEDICINE

## 2024-06-28 ENCOUNTER — LAB REQUISITION (OUTPATIENT)
Dept: LAB | Facility: HOSPITAL | Age: 79
End: 2024-06-28
Payer: MEDICARE

## 2024-06-28 DIAGNOSIS — E78.5 HYPERLIPIDEMIA, UNSPECIFIED: ICD-10-CM

## 2024-06-28 DIAGNOSIS — I50.9 HEART FAILURE, UNSPECIFIED: ICD-10-CM

## 2024-06-28 DIAGNOSIS — N18.30 CHRONIC KIDNEY DISEASE, STAGE 3 UNSPECIFIED: ICD-10-CM

## 2024-06-28 DIAGNOSIS — I73.9 PERIPHERAL VASCULAR DISEASE, UNSPECIFIED: ICD-10-CM

## 2024-06-28 DIAGNOSIS — I10 ESSENTIAL (PRIMARY) HYPERTENSION: ICD-10-CM

## 2024-06-28 LAB
ANION GAP SERPL CALCULATED.3IONS-SCNC: 10 MMOL/L (ref 5–15)
BASOPHILS # BLD AUTO: 0.09 10*3/MM3 (ref 0–0.2)
BASOPHILS NFR BLD AUTO: 0.8 % (ref 0–1.5)
BUN SERPL-MCNC: 17 MG/DL (ref 8–23)
BUN/CREAT SERPL: 16.3 (ref 7–25)
CALCIUM SPEC-SCNC: 8.8 MG/DL (ref 8.6–10.5)
CHLORIDE SERPL-SCNC: 102 MMOL/L (ref 98–107)
CO2 SERPL-SCNC: 21 MMOL/L (ref 22–29)
CREAT SERPL-MCNC: 1.04 MG/DL (ref 0.76–1.27)
DEPRECATED RDW RBC AUTO: 51.8 FL (ref 37–54)
EGFRCR SERPLBLD CKD-EPI 2021: 74.4 ML/MIN/1.73
EOSINOPHIL # BLD AUTO: 0.3 10*3/MM3 (ref 0–0.4)
EOSINOPHIL NFR BLD AUTO: 2.8 % (ref 0.3–6.2)
ERYTHROCYTE [DISTWIDTH] IN BLOOD BY AUTOMATED COUNT: 17 % (ref 12.3–15.4)
GLUCOSE SERPL-MCNC: 104 MG/DL (ref 65–99)
HCT VFR BLD AUTO: 24.6 % (ref 37.5–51)
HGB BLD-MCNC: 7.7 G/DL (ref 13–17.7)
IMM GRANULOCYTES # BLD AUTO: 0.05 10*3/MM3 (ref 0–0.05)
IMM GRANULOCYTES NFR BLD AUTO: 0.5 % (ref 0–0.5)
LYMPHOCYTES # BLD AUTO: 2.5 10*3/MM3 (ref 0.7–3.1)
LYMPHOCYTES NFR BLD AUTO: 23.6 % (ref 19.6–45.3)
MCH RBC QN AUTO: 26.6 PG (ref 26.6–33)
MCHC RBC AUTO-ENTMCNC: 31.3 G/DL (ref 31.5–35.7)
MCV RBC AUTO: 84.8 FL (ref 79–97)
MONOCYTES # BLD AUTO: 1.3 10*3/MM3 (ref 0.1–0.9)
MONOCYTES NFR BLD AUTO: 12.3 % (ref 5–12)
NEUTROPHILS NFR BLD AUTO: 6.36 10*3/MM3 (ref 1.7–7)
NEUTROPHILS NFR BLD AUTO: 60 % (ref 42.7–76)
NRBC BLD AUTO-RTO: 0 /100 WBC (ref 0–0.2)
PLATELET # BLD AUTO: 488 10*3/MM3 (ref 140–450)
PMV BLD AUTO: 10.2 FL (ref 6–12)
POTASSIUM SERPL-SCNC: 4.1 MMOL/L (ref 3.5–5.2)
RBC # BLD AUTO: 2.9 10*6/MM3 (ref 4.14–5.8)
SODIUM SERPL-SCNC: 133 MMOL/L (ref 136–145)
WBC NRBC COR # BLD AUTO: 10.6 10*3/MM3 (ref 3.4–10.8)

## 2024-06-28 PROCEDURE — 85025 COMPLETE CBC W/AUTO DIFF WBC: CPT | Performed by: INTERNAL MEDICINE

## 2024-06-28 PROCEDURE — 80048 BASIC METABOLIC PNL TOTAL CA: CPT | Performed by: INTERNAL MEDICINE
